# Patient Record
Sex: MALE | Race: OTHER | NOT HISPANIC OR LATINO
[De-identification: names, ages, dates, MRNs, and addresses within clinical notes are randomized per-mention and may not be internally consistent; named-entity substitution may affect disease eponyms.]

---

## 2017-01-09 ENCOUNTER — APPOINTMENT (OUTPATIENT)
Dept: CARDIOLOGY | Facility: CLINIC | Age: 27
End: 2017-01-09

## 2020-08-29 ENCOUNTER — TRANSCRIPTION ENCOUNTER (OUTPATIENT)
Age: 30
End: 2020-08-29

## 2022-02-01 ENCOUNTER — EMERGENCY (EMERGENCY)
Facility: HOSPITAL | Age: 32
LOS: 1 days | Discharge: HOME | End: 2022-02-01
Attending: EMERGENCY MEDICINE | Admitting: EMERGENCY MEDICINE
Payer: MEDICAID

## 2022-02-01 VITALS
DIASTOLIC BLOOD PRESSURE: 76 MMHG | HEART RATE: 60 BPM | SYSTOLIC BLOOD PRESSURE: 127 MMHG | RESPIRATION RATE: 18 BRPM | TEMPERATURE: 98 F | HEIGHT: 71 IN | OXYGEN SATURATION: 99 %

## 2022-02-01 DIAGNOSIS — Z98.89 OTHER SPECIFIED POSTPROCEDURAL STATES: Chronic | ICD-10-CM

## 2022-02-01 LAB
ALBUMIN SERPL ELPH-MCNC: 4 G/DL — SIGNIFICANT CHANGE UP (ref 3.3–5)
ALP SERPL-CCNC: 72 U/L — SIGNIFICANT CHANGE UP (ref 40–120)
ALT FLD-CCNC: 39 U/L — SIGNIFICANT CHANGE UP (ref 4–41)
ANION GAP SERPL CALC-SCNC: 12 MMOL/L — SIGNIFICANT CHANGE UP (ref 7–14)
AST SERPL-CCNC: 31 U/L — SIGNIFICANT CHANGE UP (ref 4–40)
BASOPHILS # BLD AUTO: 0.03 K/UL — SIGNIFICANT CHANGE UP (ref 0–0.2)
BASOPHILS NFR BLD AUTO: 0.2 % — SIGNIFICANT CHANGE UP (ref 0–2)
BILIRUB SERPL-MCNC: 0.3 MG/DL — SIGNIFICANT CHANGE UP (ref 0.2–1.2)
BUN SERPL-MCNC: 20 MG/DL — SIGNIFICANT CHANGE UP (ref 7–23)
CALCIUM SERPL-MCNC: 9.3 MG/DL — SIGNIFICANT CHANGE UP (ref 8.4–10.5)
CHLORIDE SERPL-SCNC: 102 MMOL/L — SIGNIFICANT CHANGE UP (ref 98–107)
CO2 SERPL-SCNC: 23 MMOL/L — SIGNIFICANT CHANGE UP (ref 22–31)
CREAT SERPL-MCNC: 0.84 MG/DL — SIGNIFICANT CHANGE UP (ref 0.5–1.3)
EOSINOPHIL # BLD AUTO: 0.06 K/UL — SIGNIFICANT CHANGE UP (ref 0–0.5)
EOSINOPHIL NFR BLD AUTO: 0.4 % — SIGNIFICANT CHANGE UP (ref 0–6)
GLUCOSE SERPL-MCNC: 97 MG/DL — SIGNIFICANT CHANGE UP (ref 70–99)
HCT VFR BLD CALC: 43.6 % — SIGNIFICANT CHANGE UP (ref 39–50)
HGB BLD-MCNC: 14.7 G/DL — SIGNIFICANT CHANGE UP (ref 13–17)
IANC: 12.01 K/UL — HIGH (ref 1.5–8.5)
IMM GRANULOCYTES NFR BLD AUTO: 0.4 % — SIGNIFICANT CHANGE UP (ref 0–1.5)
LIDOCAIN IGE QN: 37 U/L — SIGNIFICANT CHANGE UP (ref 7–60)
LYMPHOCYTES # BLD AUTO: 1.18 K/UL — SIGNIFICANT CHANGE UP (ref 1–3.3)
LYMPHOCYTES # BLD AUTO: 8.4 % — LOW (ref 13–44)
MCHC RBC-ENTMCNC: 28.1 PG — SIGNIFICANT CHANGE UP (ref 27–34)
MCHC RBC-ENTMCNC: 33.7 GM/DL — SIGNIFICANT CHANGE UP (ref 32–36)
MCV RBC AUTO: 83.2 FL — SIGNIFICANT CHANGE UP (ref 80–100)
MONOCYTES # BLD AUTO: 0.68 K/UL — SIGNIFICANT CHANGE UP (ref 0–0.9)
MONOCYTES NFR BLD AUTO: 4.9 % — SIGNIFICANT CHANGE UP (ref 2–14)
NEUTROPHILS # BLD AUTO: 12.01 K/UL — HIGH (ref 1.8–7.4)
NEUTROPHILS NFR BLD AUTO: 85.7 % — HIGH (ref 43–77)
NRBC # BLD: 0 /100 WBCS — SIGNIFICANT CHANGE UP
NRBC # FLD: 0 K/UL — SIGNIFICANT CHANGE UP
PLATELET # BLD AUTO: 248 K/UL — SIGNIFICANT CHANGE UP (ref 150–400)
POTASSIUM SERPL-MCNC: 3.8 MMOL/L — SIGNIFICANT CHANGE UP (ref 3.5–5.3)
POTASSIUM SERPL-SCNC: 3.8 MMOL/L — SIGNIFICANT CHANGE UP (ref 3.5–5.3)
PROT SERPL-MCNC: 7.4 G/DL — SIGNIFICANT CHANGE UP (ref 6–8.3)
RBC # BLD: 5.24 M/UL — SIGNIFICANT CHANGE UP (ref 4.2–5.8)
RBC # FLD: 13.2 % — SIGNIFICANT CHANGE UP (ref 10.3–14.5)
SODIUM SERPL-SCNC: 137 MMOL/L — SIGNIFICANT CHANGE UP (ref 135–145)
WBC # BLD: 14.01 K/UL — HIGH (ref 3.8–10.5)
WBC # FLD AUTO: 14.01 K/UL — HIGH (ref 3.8–10.5)

## 2022-02-01 PROCEDURE — 99284 EMERGENCY DEPT VISIT MOD MDM: CPT

## 2022-02-01 PROCEDURE — 76705 ECHO EXAM OF ABDOMEN: CPT | Mod: 26

## 2022-02-01 RX ORDER — SIMETHICONE 80 MG/1
80 TABLET, CHEWABLE ORAL ONCE
Refills: 0 | Status: COMPLETED | OUTPATIENT
Start: 2022-02-01 | End: 2022-02-01

## 2022-02-01 RX ORDER — SUCRALFATE 1 G
1 TABLET ORAL
Qty: 30 | Refills: 0
Start: 2022-02-01 | End: 2022-02-10

## 2022-02-01 RX ORDER — FAMOTIDINE 10 MG/ML
1 INJECTION INTRAVENOUS
Qty: 30 | Refills: 0
Start: 2022-02-01 | End: 2022-02-15

## 2022-02-01 RX ORDER — SUCRALFATE 1 G
1 TABLET ORAL ONCE
Refills: 0 | Status: COMPLETED | OUTPATIENT
Start: 2022-02-01 | End: 2022-02-01

## 2022-02-01 RX ORDER — FAMOTIDINE 10 MG/ML
20 INJECTION INTRAVENOUS ONCE
Refills: 0 | Status: COMPLETED | OUTPATIENT
Start: 2022-02-01 | End: 2022-02-01

## 2022-02-01 RX ORDER — ONDANSETRON 8 MG/1
4 TABLET, FILM COATED ORAL ONCE
Refills: 0 | Status: COMPLETED | OUTPATIENT
Start: 2022-02-01 | End: 2022-02-01

## 2022-02-01 RX ORDER — SODIUM CHLORIDE 9 MG/ML
1000 INJECTION INTRAMUSCULAR; INTRAVENOUS; SUBCUTANEOUS ONCE
Refills: 0 | Status: COMPLETED | OUTPATIENT
Start: 2022-02-01 | End: 2022-02-01

## 2022-02-01 RX ADMIN — ONDANSETRON 4 MILLIGRAM(S): 8 TABLET, FILM COATED ORAL at 18:50

## 2022-02-01 RX ADMIN — Medication 30 MILLILITER(S): at 18:51

## 2022-02-01 RX ADMIN — FAMOTIDINE 20 MILLIGRAM(S): 10 INJECTION INTRAVENOUS at 18:51

## 2022-02-01 RX ADMIN — SODIUM CHLORIDE 1000 MILLILITER(S): 9 INJECTION INTRAMUSCULAR; INTRAVENOUS; SUBCUTANEOUS at 20:09

## 2022-02-01 RX ADMIN — SIMETHICONE 80 MILLIGRAM(S): 80 TABLET, CHEWABLE ORAL at 21:01

## 2022-02-01 RX ADMIN — Medication 1 GRAM(S): at 21:01

## 2022-02-01 NOTE — ED ADULT NURSE NOTE - OBJECTIVE STATEMENT
Pt AOx4 came in c/o abdominal pain started since this afternoon. IV to left hand by EMS intact. labs done and meds given as ordered. awaiting for US.

## 2022-02-01 NOTE — ED ADULT TRIAGE NOTE - CHIEF COMPLAINT QUOTE
Pt brought in by EMS from home complaining of abdominal pain after eating Chipotle and abdominal pain. Pt given 100mcg of Fentanyl IVP. 20G IV placed by EMS to L hand. Pt denies chest pain, sob, fever or chills.

## 2022-02-01 NOTE — ED PROVIDER NOTE - OBJECTIVE STATEMENT
31m presents to the ed with abd pain, vomiting, diarrhea. Patient was at baseline soh this am. Ate takeout chipotle, 2 hours later began to have diarrhea/vomiting and severe epigastric nonradiating abd pain. NO fevers, chills, ha, cp, sob, dysuria.

## 2022-02-01 NOTE — ED PROVIDER NOTE - CLINICAL SUMMARY MEDICAL DECISION MAKING FREE TEXT BOX
epigastric pain with vomiting and diarrhea few hours after eating chipotle, appears nontoxic, abd soft and nontender. Plan for labs, gb us, hydration gi cocktail-will eval for elec disturbance, organ dysfunction, suspect more likely food borne toxin, reass.

## 2022-02-01 NOTE — ED ADULT NURSE NOTE - NSICDXPASTMEDICALHX_GEN_ALL_CORE_FT
PAST MEDICAL HISTORY:  Chronic abdominal pain     Constipation     GERD (gastroesophageal reflux disease)     No pertinent past medical history

## 2022-02-01 NOTE — ED PROVIDER NOTE - PATIENT PORTAL LINK FT
You can access the FollowMyHealth Patient Portal offered by [facility] by registering at the following website: [email or web address]. By joining [employer]’s FollowMyHealth portal, you will also be able to view your health information using other applications (apps) compatible with our system.

## 2022-02-01 NOTE — ED PROVIDER NOTE - PROGRESS NOTE DETAILS
CLAUDIA Hansen: Pt was reassessed, feels better, tolerated PO, discussed gallstones with pt, pt states he does not want surgery follow up at this time, willing to follow up with GI. Will D/C with outpatient follow up.

## 2022-02-01 NOTE — ED PROVIDER NOTE - PHYSICAL EXAMINATION
GEN - NAD; well appearing; A+O x3   HEAD - NC/AT   EYES- PERRL, EOMI  ENT: Airway patent, dry mm, Oral cavity and pharynx normal. No inflammation, swelling, exudate, or lesions.    NECK: Neck supple, no masses.  PULMONARY - CTA b/l, symmetric breath sounds.   CARDIAC -s1s2, RRR, no M,G,R  ABDOMEN - +BS, ND, NT, soft, no guarding, no rebound, no masses   BACK - no CVA tenderness, Normal  spine   EXTREMITIES - FROM, symmetric pulses, capillary refill < 2 seconds, no edema   SKIN - no rash or bruising   NEUROLOGIC - alert, speech clear, no focal deficits  PSYCH -nl mood/affect, nl insight.

## 2022-02-02 ENCOUNTER — APPOINTMENT (OUTPATIENT)
Dept: GASTROENTEROLOGY | Facility: CLINIC | Age: 32
End: 2022-02-02
Payer: MEDICAID

## 2022-02-02 VITALS
BODY MASS INDEX: 32.76 KG/M2 | OXYGEN SATURATION: 95 % | HEART RATE: 77 BPM | TEMPERATURE: 98.5 F | DIASTOLIC BLOOD PRESSURE: 79 MMHG | HEIGHT: 71 IN | WEIGHT: 234 LBS | SYSTOLIC BLOOD PRESSURE: 128 MMHG

## 2022-02-02 PROCEDURE — 99203 OFFICE O/P NEW LOW 30 MIN: CPT

## 2022-02-02 NOTE — ASSESSMENT
[FreeTextEntry1] : Upper abdominal pain and dyspepsia rule out gastritis, H. pylori, peptic ulcer disease.

## 2022-02-02 NOTE — HISTORY OF PRESENT ILLNESS
[FreeTextEntry1] : Edward rodney a Mohamed is a 31-year-old  male was recently seen in the emergency room for upper abdominal pain gas bloating nausea and vomiting which lasted for 2 days.  He said he had similar symptoms for in 2050.  Denies rectal bleeding melena diarrhea or weight loss.  Denies taking nonsteroidal medications or aspirin denies taking alcohol.  Admits smoking cigarettes and marijuana regularly.  He had an upper endoscopy and colonoscopy about 7 years ago supposedly normal.  He thinks his large bowel is obstructed with stools.  He is asking for bowel cleansers and colonoscopy

## 2022-02-02 NOTE — PHYSICAL EXAM
[General Appearance - Alert] : alert [General Appearance - In No Acute Distress] : in no acute distress [Sclera] : the sclera and conjunctiva were normal [Extraocular Movements] : extraocular movements were intact [Outer Ear] : the ears and nose were normal in appearance [Examination Of The Oral Cavity] : the lips and gums were normal [Neck Appearance] : the appearance of the neck was normal [Neck Cervical Mass (___cm)] : no neck mass was observed [Jugular Venous Distention Increased] : there was no jugular-venous distention [Thyroid Diffuse Enlargement] : the thyroid was not enlarged [Thyroid Nodule] : there were no palpable thyroid nodules [Auscultation Breath Sounds / Voice Sounds] : lungs were clear to auscultation bilaterally [Full Pulse] : the pedal pulses are present [Edema] : there was no peripheral edema [Bowel Sounds] : normal bowel sounds [Abdomen Soft] : soft [Abdomen Tenderness] : non-tender [Abdomen Mass (___ Cm)] : no abdominal mass palpated [Cervical Lymph Nodes Enlarged Posterior Bilaterally] : posterior cervical [Cervical Lymph Nodes Enlarged Anterior Bilaterally] : anterior cervical [Supraclavicular Lymph Nodes Enlarged Bilaterally] : supraclavicular [Axillary Lymph Nodes Enlarged Bilaterally] : axillary [Femoral Lymph Nodes Enlarged Bilaterally] : femoral [Inguinal Lymph Nodes Enlarged Bilaterally] : inguinal [No Spinal Tenderness] : no spinal tenderness [Abnormal Walk] : normal gait [Nail Clubbing] : no clubbing  or cyanosis of the fingernails [Involuntary Movements] : no involuntary movements were seen [Skin Color & Pigmentation] : normal skin color and pigmentation [Skin Turgor] : normal skin turgor [] : no rash [Motor Exam] : the motor exam was normal [No Focal Deficits] : no focal deficits [Oriented To Time, Place, And Person] : oriented to person, place, and time

## 2023-08-10 DIAGNOSIS — Z13.79 ENCOUNTER FOR OTHER SCREENING FOR GENETIC AND CHROMOSOMAL ANOMALIES: ICD-10-CM

## 2023-08-25 LAB
MISCELLANEOUS TEST: NORMAL
PROC NAME: NORMAL

## 2023-08-31 LAB
MISCELLANEOUS TEST: NORMAL
PROC NAME: NORMAL

## 2023-09-11 ENCOUNTER — TRANSCRIPTION ENCOUNTER (OUTPATIENT)
Age: 33
End: 2023-09-11

## 2024-02-07 ENCOUNTER — APPOINTMENT (OUTPATIENT)
Dept: SURGERY | Facility: CLINIC | Age: 34
End: 2024-02-07
Payer: COMMERCIAL

## 2024-02-07 VITALS
OXYGEN SATURATION: 98 % | BODY MASS INDEX: 30.8 KG/M2 | WEIGHT: 220 LBS | HEART RATE: 54 BPM | SYSTOLIC BLOOD PRESSURE: 116 MMHG | RESPIRATION RATE: 17 BRPM | DIASTOLIC BLOOD PRESSURE: 80 MMHG | TEMPERATURE: 98.7 F | HEIGHT: 71 IN

## 2024-02-07 DIAGNOSIS — K80.20 CALCULUS OF GALLBLADDER W/OUT CHOLECYSTITIS W/OUT OBSTRUCTION: ICD-10-CM

## 2024-02-07 PROCEDURE — 99203 OFFICE O/P NEW LOW 30 MIN: CPT

## 2024-02-07 RX ORDER — OMEPRAZOLE 20 MG/1
TABLET, DELAYED RELEASE ORAL
Refills: 0 | Status: ACTIVE | COMMUNITY

## 2024-02-07 RX ORDER — FAMOTIDINE 40 MG/1
40 TABLET, FILM COATED ORAL
Refills: 0 | Status: ACTIVE | COMMUNITY

## 2024-02-07 NOTE — CONSULT LETTER
[Dear  ___] : Dear  [unfilled], [Consult Letter:] : I had the pleasure of evaluating your patient, [unfilled]. [Please see my note below.] : Please see my note below. [Consult Closing:] : Thank you very much for allowing me to participate in the care of this patient.  If you have any questions, please do not hesitate to contact me. [Sincerely,] : Sincerely, [FreeTextEntry3] : Reji Valladares MD, FACS [facility] [address]  [place]  [address] Tel: [phone] Email: [email or web address]

## 2024-02-07 NOTE — HISTORY OF PRESENT ILLNESS
[de-identified] : ALYSIA  is a 33 year  male  here for a consultation for possible gallstones. [de-identified] : This 33-year-old patient complains of a 10 to 15-year history of intermittent episodes of right upper quadrant pain.  The pain frequently radiates to his right side and back.  He has nausea and emesis with these episodes.  His pain is frequently brought on by fatty foods.  These painful episodes last up to several hours then subside spontaneously.

## 2024-02-07 NOTE — PLAN
[FreeTextEntry1] : I have offered this patient a laparoscopic cholecystectomy.  The procedure as well as risks(including, but not limited to bleeding, infection, injury to hollow viscus, injury to bile ducts), benefits (pain relief), and alternatives were explained to the patient.  Please advise me on the safety of general anesthesia for this very pleasant patient

## 2024-02-07 NOTE — DATA REVIEWED
[FreeTextEntry1] : I reviewed the sonogram dated January 19.  It is consistent with stones and sludge without gallbladder wall thickening or pericholecystic fluid.  The common bile duct was measured at 4 mm  I also reviewed blood work dated January 19 which was significant for a lipase of 173, Hemoglobin A1c of a normal CBC, nonreactive hepatitis serology

## 2024-02-14 ENCOUNTER — OUTPATIENT (OUTPATIENT)
Dept: OUTPATIENT SERVICES | Facility: HOSPITAL | Age: 34
LOS: 1 days | End: 2024-02-14
Payer: COMMERCIAL

## 2024-02-14 VITALS
RESPIRATION RATE: 18 BRPM | HEART RATE: 63 BPM | WEIGHT: 220.46 LBS | SYSTOLIC BLOOD PRESSURE: 127 MMHG | HEIGHT: 70 IN | OXYGEN SATURATION: 97 % | DIASTOLIC BLOOD PRESSURE: 80 MMHG | TEMPERATURE: 99 F

## 2024-02-14 DIAGNOSIS — Z98.890 OTHER SPECIFIED POSTPROCEDURAL STATES: Chronic | ICD-10-CM

## 2024-02-14 DIAGNOSIS — Z01.818 ENCOUNTER FOR OTHER PREPROCEDURAL EXAMINATION: ICD-10-CM

## 2024-02-14 DIAGNOSIS — Z98.89 OTHER SPECIFIED POSTPROCEDURAL STATES: Chronic | ICD-10-CM

## 2024-02-14 DIAGNOSIS — K80.20 CALCULUS OF GALLBLADDER WITHOUT CHOLECYSTITIS WITHOUT OBSTRUCTION: ICD-10-CM

## 2024-02-14 PROCEDURE — 86850 RBC ANTIBODY SCREEN: CPT

## 2024-02-14 PROCEDURE — G0463: CPT

## 2024-02-14 PROCEDURE — 86901 BLOOD TYPING SEROLOGIC RH(D): CPT

## 2024-02-14 PROCEDURE — 86900 BLOOD TYPING SEROLOGIC ABO: CPT

## 2024-02-14 PROCEDURE — 36415 COLL VENOUS BLD VENIPUNCTURE: CPT

## 2024-02-14 NOTE — H&P PST ADULT - NSICDXPASTMEDICALHX_GEN_ALL_CORE_FT
PAST MEDICAL HISTORY:  Chronic abdominal pain     Constipation     GERD (gastroesophageal reflux disease)     Seasonal rhinitis

## 2024-02-14 NOTE — H&P PST ADULT - NSICDXPASTSURGICALHX_GEN_ALL_CORE_FT
PAST SURGICAL HISTORY:  History of appendectomy     History of colonoscopy     History of endoscopy

## 2024-02-14 NOTE — H&P PST ADULT - NSANTHOSAYNRD_GEN_A_CORE
neck 17 inches/No. BETH screening performed.  STOP BANG Legend: 0-2 = LOW Risk; 3-4 = INTERMEDIATE Risk; 5-8 = HIGH Risk

## 2024-02-14 NOTE — H&P PST ADULT - HISTORY OF PRESENT ILLNESS
Patient is a [age] year old M/F with PMHx of // with no pertinent medical history presents for perioperative testing for laparoscopic cholecystectomy with ICG with Dr. Reji Valenzuela scheduled for 02/16/2024. Denies any acute symptoms at this time. Otherwise patient reports feeling well overall.          RUQ pain with nausea, voitng, bloating after eatinf  Patient is a 33 year old M with no pertinent medical history presents for perioperative testing for laparoscopic cholecystectomy with ICG with Dr. Reji Valenzueal scheduled for 02/16/2024. Reports RUQ pain with nausea, vomiting bloating after eating for the past 12 years, therefore has opted for surgical intervention.  Denies any acute symptoms at this time. Otherwise patient reports feeling well overall.

## 2024-02-15 ENCOUNTER — TRANSCRIPTION ENCOUNTER (OUTPATIENT)
Age: 34
End: 2024-02-15

## 2024-02-16 ENCOUNTER — RESULT REVIEW (OUTPATIENT)
Age: 34
End: 2024-02-16

## 2024-02-16 ENCOUNTER — TRANSCRIPTION ENCOUNTER (OUTPATIENT)
Age: 34
End: 2024-02-16

## 2024-02-16 ENCOUNTER — APPOINTMENT (OUTPATIENT)
Dept: SURGERY | Facility: HOSPITAL | Age: 34
End: 2024-02-16
Payer: COMMERCIAL

## 2024-02-16 ENCOUNTER — INPATIENT (INPATIENT)
Facility: HOSPITAL | Age: 34
LOS: 0 days | Discharge: HOME | DRG: 419 | End: 2024-02-16
Attending: HOSPITALIST | Admitting: HOSPITALIST
Payer: COMMERCIAL

## 2024-02-16 VITALS
WEIGHT: 220.46 LBS | OXYGEN SATURATION: 98 % | HEIGHT: 70 IN | TEMPERATURE: 98 F | DIASTOLIC BLOOD PRESSURE: 77 MMHG | RESPIRATION RATE: 21 BRPM | SYSTOLIC BLOOD PRESSURE: 118 MMHG | HEART RATE: 68 BPM

## 2024-02-16 VITALS
TEMPERATURE: 97 F | SYSTOLIC BLOOD PRESSURE: 145 MMHG | HEART RATE: 55 BPM | OXYGEN SATURATION: 96 % | DIASTOLIC BLOOD PRESSURE: 85 MMHG | RESPIRATION RATE: 16 BRPM

## 2024-02-16 DIAGNOSIS — Z98.890 OTHER SPECIFIED POSTPROCEDURAL STATES: Chronic | ICD-10-CM

## 2024-02-16 DIAGNOSIS — Z98.89 OTHER SPECIFIED POSTPROCEDURAL STATES: Chronic | ICD-10-CM

## 2024-02-16 DIAGNOSIS — K80.50 CALCULUS OF BILE DUCT WITHOUT CHOLANGITIS OR CHOLECYSTITIS WITHOUT OBSTRUCTION: ICD-10-CM

## 2024-02-16 PROBLEM — J30.2 OTHER SEASONAL ALLERGIC RHINITIS: Chronic | Status: ACTIVE | Noted: 2024-02-14

## 2024-02-16 LAB
ALBUMIN SERPL ELPH-MCNC: 3.4 G/DL — SIGNIFICANT CHANGE UP (ref 3.3–5)
ALP SERPL-CCNC: 63 U/L — SIGNIFICANT CHANGE UP (ref 40–120)
ALT FLD-CCNC: 37 U/L — SIGNIFICANT CHANGE UP (ref 10–45)
ANION GAP SERPL CALC-SCNC: 9 MMOL/L — SIGNIFICANT CHANGE UP (ref 5–17)
APTT BLD: 35 SEC — SIGNIFICANT CHANGE UP (ref 24.5–35.6)
AST SERPL-CCNC: 27 U/L — SIGNIFICANT CHANGE UP (ref 10–40)
BASOPHILS # BLD AUTO: 0.04 K/UL — SIGNIFICANT CHANGE UP (ref 0–0.2)
BASOPHILS NFR BLD AUTO: 0.5 % — SIGNIFICANT CHANGE UP (ref 0–2)
BILIRUB SERPL-MCNC: 0.4 MG/DL — SIGNIFICANT CHANGE UP (ref 0.2–1.2)
BLD GP AB SCN SERPL QL: SIGNIFICANT CHANGE UP
BUN SERPL-MCNC: 19 MG/DL — SIGNIFICANT CHANGE UP (ref 7–23)
CALCIUM SERPL-MCNC: 8.7 MG/DL — SIGNIFICANT CHANGE UP (ref 8.4–10.5)
CHLORIDE SERPL-SCNC: 104 MMOL/L — SIGNIFICANT CHANGE UP (ref 96–108)
CO2 SERPL-SCNC: 28 MMOL/L — SIGNIFICANT CHANGE UP (ref 22–31)
CREAT SERPL-MCNC: 0.98 MG/DL — SIGNIFICANT CHANGE UP (ref 0.5–1.3)
EGFR: 106 ML/MIN/1.73M2 — SIGNIFICANT CHANGE UP
EOSINOPHIL # BLD AUTO: 0.1 K/UL — SIGNIFICANT CHANGE UP (ref 0–0.5)
EOSINOPHIL NFR BLD AUTO: 1.2 % — SIGNIFICANT CHANGE UP (ref 0–6)
GLUCOSE SERPL-MCNC: 101 MG/DL — HIGH (ref 70–99)
HCT VFR BLD CALC: 39.7 % — SIGNIFICANT CHANGE UP (ref 39–50)
HGB BLD-MCNC: 13.3 G/DL — SIGNIFICANT CHANGE UP (ref 13–17)
IMM GRANULOCYTES NFR BLD AUTO: 0.2 % — SIGNIFICANT CHANGE UP (ref 0–0.9)
INR BLD: 1.06 RATIO — SIGNIFICANT CHANGE UP (ref 0.85–1.18)
LIDOCAIN IGE QN: 69 U/L — SIGNIFICANT CHANGE UP (ref 16–77)
LYMPHOCYTES # BLD AUTO: 1.64 K/UL — SIGNIFICANT CHANGE UP (ref 1–3.3)
LYMPHOCYTES # BLD AUTO: 19.2 % — SIGNIFICANT CHANGE UP (ref 13–44)
MCHC RBC-ENTMCNC: 28.1 PG — SIGNIFICANT CHANGE UP (ref 27–34)
MCHC RBC-ENTMCNC: 33.5 GM/DL — SIGNIFICANT CHANGE UP (ref 32–36)
MCV RBC AUTO: 83.9 FL — SIGNIFICANT CHANGE UP (ref 80–100)
MONOCYTES # BLD AUTO: 0.6 K/UL — SIGNIFICANT CHANGE UP (ref 0–0.9)
MONOCYTES NFR BLD AUTO: 7 % — SIGNIFICANT CHANGE UP (ref 2–14)
NEUTROPHILS # BLD AUTO: 6.13 K/UL — SIGNIFICANT CHANGE UP (ref 1.8–7.4)
NEUTROPHILS NFR BLD AUTO: 71.9 % — SIGNIFICANT CHANGE UP (ref 43–77)
NRBC # BLD: 0 /100 WBCS — SIGNIFICANT CHANGE UP (ref 0–0)
PLATELET # BLD AUTO: 250 K/UL — SIGNIFICANT CHANGE UP (ref 150–400)
POTASSIUM SERPL-MCNC: 4.2 MMOL/L — SIGNIFICANT CHANGE UP (ref 3.5–5.3)
POTASSIUM SERPL-SCNC: 4.2 MMOL/L — SIGNIFICANT CHANGE UP (ref 3.5–5.3)
PROT SERPL-MCNC: 7.2 G/DL — SIGNIFICANT CHANGE UP (ref 6–8.3)
PROTHROM AB SERPL-ACNC: 12.1 SEC — SIGNIFICANT CHANGE UP (ref 9.5–13)
RBC # BLD: 4.73 M/UL — SIGNIFICANT CHANGE UP (ref 4.2–5.8)
RBC # FLD: 13.1 % — SIGNIFICANT CHANGE UP (ref 10.3–14.5)
SODIUM SERPL-SCNC: 141 MMOL/L — SIGNIFICANT CHANGE UP (ref 135–145)
WBC # BLD: 8.53 K/UL — SIGNIFICANT CHANGE UP (ref 3.8–10.5)
WBC # FLD AUTO: 8.53 K/UL — SIGNIFICANT CHANGE UP (ref 3.8–10.5)

## 2024-02-16 PROCEDURE — 99285 EMERGENCY DEPT VISIT HI MDM: CPT

## 2024-02-16 PROCEDURE — C1889: CPT

## 2024-02-16 PROCEDURE — 88304 TISSUE EXAM BY PATHOLOGIST: CPT | Mod: 26

## 2024-02-16 PROCEDURE — 93005 ELECTROCARDIOGRAM TRACING: CPT

## 2024-02-16 PROCEDURE — 47563 LAPARO CHOLECYSTECTOMY/GRAPH: CPT | Mod: 82

## 2024-02-16 PROCEDURE — 85610 PROTHROMBIN TIME: CPT

## 2024-02-16 PROCEDURE — C9399: CPT

## 2024-02-16 PROCEDURE — 80053 COMPREHEN METABOLIC PANEL: CPT

## 2024-02-16 PROCEDURE — 86900 BLOOD TYPING SEROLOGIC ABO: CPT

## 2024-02-16 PROCEDURE — 83690 ASSAY OF LIPASE: CPT

## 2024-02-16 PROCEDURE — 85730 THROMBOPLASTIN TIME PARTIAL: CPT

## 2024-02-16 PROCEDURE — 99223 1ST HOSP IP/OBS HIGH 75: CPT

## 2024-02-16 PROCEDURE — 47563 LAPARO CHOLECYSTECTOMY/GRAPH: CPT

## 2024-02-16 PROCEDURE — 86850 RBC ANTIBODY SCREEN: CPT

## 2024-02-16 PROCEDURE — 88304 TISSUE EXAM BY PATHOLOGIST: CPT

## 2024-02-16 PROCEDURE — 36415 COLL VENOUS BLD VENIPUNCTURE: CPT

## 2024-02-16 PROCEDURE — 86901 BLOOD TYPING SEROLOGIC RH(D): CPT

## 2024-02-16 PROCEDURE — 93010 ELECTROCARDIOGRAM REPORT: CPT

## 2024-02-16 PROCEDURE — 85025 COMPLETE CBC W/AUTO DIFF WBC: CPT

## 2024-02-16 RX ORDER — ACETAMINOPHEN 500 MG
650 TABLET ORAL EVERY 6 HOURS
Refills: 0 | Status: DISCONTINUED | OUTPATIENT
Start: 2024-02-16 | End: 2024-02-16

## 2024-02-16 RX ORDER — HYDROMORPHONE HYDROCHLORIDE 2 MG/ML
0.5 INJECTION INTRAMUSCULAR; INTRAVENOUS; SUBCUTANEOUS
Refills: 0 | Status: DISCONTINUED | OUTPATIENT
Start: 2024-02-16 | End: 2024-02-16

## 2024-02-16 RX ORDER — POLYETHYLENE GLYCOL 3350 17 G/17G
17 POWDER, FOR SOLUTION ORAL DAILY
Refills: 0 | Status: CANCELLED | OUTPATIENT
Start: 2024-02-17 | End: 2024-02-16

## 2024-02-16 RX ORDER — OXYCODONE HYDROCHLORIDE 5 MG/1
1 TABLET ORAL
Qty: 10 | Refills: 0
Start: 2024-02-16

## 2024-02-16 RX ORDER — MORPHINE SULFATE 50 MG/1
4 CAPSULE, EXTENDED RELEASE ORAL EVERY 4 HOURS
Refills: 0 | Status: DISCONTINUED | OUTPATIENT
Start: 2024-02-16 | End: 2024-02-16

## 2024-02-16 RX ORDER — ONDANSETRON 8 MG/1
4 TABLET, FILM COATED ORAL ONCE
Refills: 0 | Status: COMPLETED | OUTPATIENT
Start: 2024-02-16 | End: 2024-02-16

## 2024-02-16 RX ORDER — KETOROLAC TROMETHAMINE 30 MG/ML
30 SYRINGE (ML) INJECTION ONCE
Refills: 0 | Status: DISCONTINUED | OUTPATIENT
Start: 2024-02-16 | End: 2024-02-16

## 2024-02-16 RX ORDER — OXYCODONE HYDROCHLORIDE 5 MG/1
5 TABLET ORAL ONCE
Refills: 0 | Status: DISCONTINUED | OUTPATIENT
Start: 2024-02-16 | End: 2024-02-16

## 2024-02-16 RX ORDER — SODIUM CHLORIDE 9 MG/ML
1000 INJECTION INTRAMUSCULAR; INTRAVENOUS; SUBCUTANEOUS ONCE
Refills: 0 | Status: COMPLETED | OUTPATIENT
Start: 2024-02-16 | End: 2024-02-16

## 2024-02-16 RX ORDER — ONDANSETRON 8 MG/1
4 TABLET, FILM COATED ORAL EVERY 8 HOURS
Refills: 0 | Status: DISCONTINUED | OUTPATIENT
Start: 2024-02-16 | End: 2024-02-16

## 2024-02-16 RX ORDER — SODIUM CHLORIDE 9 MG/ML
1000 INJECTION, SOLUTION INTRAVENOUS
Refills: 0 | Status: DISCONTINUED | OUTPATIENT
Start: 2024-02-16 | End: 2024-02-16

## 2024-02-16 RX ORDER — LANOLIN ALCOHOL/MO/W.PET/CERES
3 CREAM (GRAM) TOPICAL AT BEDTIME
Refills: 0 | Status: DISCONTINUED | OUTPATIENT
Start: 2024-02-16 | End: 2024-02-16

## 2024-02-16 RX ADMIN — OXYCODONE HYDROCHLORIDE 5 MILLIGRAM(S): 5 TABLET ORAL at 17:36

## 2024-02-16 RX ADMIN — Medication 30 MILLIGRAM(S): at 13:58

## 2024-02-16 RX ADMIN — HYDROMORPHONE HYDROCHLORIDE 0.5 MILLIGRAM(S): 2 INJECTION INTRAMUSCULAR; INTRAVENOUS; SUBCUTANEOUS at 12:49

## 2024-02-16 RX ADMIN — OXYCODONE HYDROCHLORIDE 5 MILLIGRAM(S): 5 TABLET ORAL at 17:03

## 2024-02-16 RX ADMIN — HYDROMORPHONE HYDROCHLORIDE 0.5 MILLIGRAM(S): 2 INJECTION INTRAMUSCULAR; INTRAVENOUS; SUBCUTANEOUS at 13:05

## 2024-02-16 RX ADMIN — ONDANSETRON 4 MILLIGRAM(S): 8 TABLET, FILM COATED ORAL at 14:19

## 2024-02-16 RX ADMIN — HYDROMORPHONE HYDROCHLORIDE 0.5 MILLIGRAM(S): 2 INJECTION INTRAMUSCULAR; INTRAVENOUS; SUBCUTANEOUS at 12:51

## 2024-02-16 RX ADMIN — HYDROMORPHONE HYDROCHLORIDE 0.5 MILLIGRAM(S): 2 INJECTION INTRAMUSCULAR; INTRAVENOUS; SUBCUTANEOUS at 12:39

## 2024-02-16 RX ADMIN — HYDROMORPHONE HYDROCHLORIDE 0.5 MILLIGRAM(S): 2 INJECTION INTRAMUSCULAR; INTRAVENOUS; SUBCUTANEOUS at 13:20

## 2024-02-16 RX ADMIN — Medication 30 MILLIGRAM(S): at 13:20

## 2024-02-16 RX ADMIN — SODIUM CHLORIDE 1000 MILLILITER(S): 9 INJECTION INTRAMUSCULAR; INTRAVENOUS; SUBCUTANEOUS at 07:57

## 2024-02-16 RX ADMIN — HYDROMORPHONE HYDROCHLORIDE 0.5 MILLIGRAM(S): 2 INJECTION INTRAMUSCULAR; INTRAVENOUS; SUBCUTANEOUS at 13:06

## 2024-02-16 NOTE — ED PROVIDER NOTE - PATIENT PORTAL LINK FT
You can access the FollowMyHealth Patient Portal offered by [facility] by registering at the following website: [email or web address]. By joining [employer]’s FollowMyHealth portal, you will also be able to view your health information using other applications (apps) compatible with our system.

## 2024-02-16 NOTE — CHART NOTE - NSCHARTNOTEFT_GEN_A_CORE
33 year old male with past medical history of GERD, cholelithiasis presents from home with RUQ pain. Patient reports >10 year history of cholelithiasis with intermittent RUQ pain previously managed with diet and exercise.  He's now having more frequent RUQ pain with increased intensity, unrelated to food. Pain is located in the RUQ, non-radiating, associated with nausea and diaphoresis. Last night he developed 10/10 RUQ pain that woke him up from sleep. Pain is consistent with his usual biliary colic. Pain was uncontrolled so he called EMS, improved now s/p IV morphine en route. In the ED, he has been afebrile and hemodynamically stable. Pt underwent Lap perfecto 2/16, postop pt had abd pain, on RUQ and shoulder. Pain controlled with medication, denies chest pain, sob. dizziness, n,v      PAST MEDICAL & SURGICAL HISTORY:  Chronic abdominal pain  nausea and vomiting      GERD (gastroesophageal reflux disease)      Constipation      Seasonal rhinitis      History of appendectomy      History of colonoscopy      History of endoscopy      MEDICATIONS  (STANDING):  lactated ringers. 1000 milliLiter(s) (75 mL/Hr) IV Continuous <Continuous>    MEDICATIONS  (PRN):  HYDROmorphone  Injectable 0.5 milliGRAM(s) IV Push every 10 minutes PRN Moderate Pain (4 - 6)  oxyCODONE    IR 5 milliGRAM(s) Oral once PRN Severe Pain (7 - 10)    PE: Vital Signs Last 24 Hrs  T(C): 37.1 (16 Feb 2024 12:33), Max: 37.1 (16 Feb 2024 12:33)  T(F): 98.8 (16 Feb 2024 12:33), Max: 98.8 (16 Feb 2024 12:33)  HR: 52 (16 Feb 2024 15:15) (48 - 79)  BP: 118/85 (16 Feb 2024 15:15) (118/77 - 146/100)  BP(mean): --  RR: 18 (16 Feb 2024 15:15) (13 - 21)  SpO2: 100% (16 Feb 2024 15:15) (95% - 100%)    Parameters below as of 16 Feb 2024 14:30  Patient On (Oxygen Delivery Method): nasal cannula  O2 Flow (L/min): 3  Gen: A& O x3 nad  abd: incisions clean dry steri strips intact, softly distended, tender on RUQ  : voiding  Calfs: bilateral soft, nontender, no swelling     Plan  Discussed pts vitals and conditon with Surgeon, medicine, and Anesthesia.  agreed for dc with surgeon fu as outpt

## 2024-02-16 NOTE — H&P ADULT - NSHPPHYSICALEXAM_GEN_ALL_CORE
regular T(C): 36.6 (02-16-24 @ 05:57), Max: 36.6 (02-16-24 @ 05:57)  HR: 68 (02-16-24 @ 05:57) (68 - 68)  BP: 118/77 (02-16-24 @ 05:57) (118/77 - 118/77)  RR: 21 (02-16-24 @ 05:57) (21 - 21)  SpO2: 98% (02-16-24 @ 05:57) (98% - 98%)  Wt(kg): --Vital Signs Last 24 Hrs  T(C): 36.6 (16 Feb 2024 05:57), Max: 36.6 (16 Feb 2024 05:57)  T(F): 97.9 (16 Feb 2024 05:57), Max: 97.9 (16 Feb 2024 05:57)  HR: 68 (16 Feb 2024 05:57) (68 - 68)  BP: 118/77 (16 Feb 2024 05:57) (118/77 - 118/77)  BP(mean): --  RR: 21 (16 Feb 2024 05:57) (21 - 21)  SpO2: 98% (16 Feb 2024 05:57) (98% - 98%)    Parameters below as of 16 Feb 2024 05:57  Patient On (Oxygen Delivery Method): room air    PHYSICAL EXAM:  GENERAL: NAD, well appearing  HEENT: NCAT  NECK: Supple, No JVD  CHEST/LUNG: Clear to percussion bilaterally; No rales, rhonchi, wheezing  HEART: Regular rate and rhythm; No murmurs  ABDOMEN: Soft, Nontender, Nondistended; Bowel sounds present  MUSCULOSKELETAL/EXTREMITIES:  2+ Peripheral Pulses, No LE edema  SKIN: No rashes or lesions  PSYCH: Appropriate affect  NEURO: AAO x 3, nonfocal

## 2024-02-16 NOTE — ED PROVIDER NOTE - PHYSICAL EXAMINATION
Vitals: I have reviewed the patients vital signs  General: nontoxic appearing  HEENT: Atraumatic, normocephalic, airway patent  Eyes: EOMI, tracking appropriately  Neck: no tracheal deviation  Chest/Lungs: no trauma, symmetric chest rise, speaking in complete sentences,  no resp distress  Heart: skin and extremities well perfused, regular rate and rhythm  Neuro: A+Ox3, appears non focal  MSK: no deformities  Skin: no cyanosis, no jaundice   Psych:  Normal mood and affect  Abdomen: Soft nontender normal active bowel sounds

## 2024-02-16 NOTE — ASU DISCHARGE PLAN (ADULT/PEDIATRIC) - CARE PROVIDER_API CALL
Reji Valenzuela.  Surgery  [address], [address]  [place] [address]  Phone: [phone]  Fax: [phone]  Follow Up Time: 2 weeks

## 2024-02-16 NOTE — ED ADULT NURSE NOTE - CHIEF COMPLAINT QUOTE
pt  from  home [place]   he is scheduled for gallstone removal today at  9 am with doctor Reji Russell   he awoke with extreme and called  911  ems gave I litre  ns and 5 mg morphine ivp en route by  ems pt  states  pain  relieved

## 2024-02-16 NOTE — H&P ADULT - HISTORY OF PRESENT ILLNESS
33 year old male with past medical history of cholelithiasis presents from home with RUQ pain. Patient reports >10 year history of cholelithiasis with intermittent RUQ pain previously managed with diet and exercise.  He's now having more frequent RUQ pain with increased intensity, unrelated to food. Pain is located in the RUQ, non-radiating, associated with nausea and diaphoresis. He saw Dr. Valenzuela outpatient and is scheduled for elective cholecystectomy 2/16.  Last night he developed 10/10 RUQ pain that woke him up from sleep so he called EMS. Pain is improved now s/p IV morphine en route. He denies any other complaints.     In the ED, he has been afebrile and hemodynamically stable.  Labs were otherwise unremarkable, no leukocytosis, LFT and lipase WNL.    He was seen by surgery and will be going for scheduled lap perfecto this AM. 33 year old male with past medical history of GERD, cholelithiasis presents from home with RUQ pain. Patient reports >10 year history of cholelithiasis with intermittent RUQ pain previously managed with diet and exercise.  He's now having more frequent RUQ pain with increased intensity, unrelated to food. Pain is located in the RUQ, non-radiating, associated with nausea and diaphoresis. He saw Dr. Valenzuela outpatient and is scheduled for elective cholecystectomy 2/16.  Last night he developed 10/10 RUQ pain that woke him up from sleep. Pain is consistent with his usual biliary colic. Pain was uncontrolled so he called EMS, improved now s/p IV morphine en route. He denies any other complaints.     In the ED, he has been afebrile and hemodynamically stable.  Labs were otherwise unremarkable, no leukocytosis, LFT and lipase WNL.    He was seen by surgery and will be going for scheduled lap perfecto this AM.

## 2024-02-16 NOTE — H&P ADULT - NSHPLABSRESULTS_GEN_ALL_CORE
LABS:                        13.3   8.53  )-----------( 250      ( 16 Feb 2024 06:00 )             39.7     02-16    141  |  104  |  19  ----------------------------<  101<H>  4.2   |  28  |  0.98    Ca    8.7      16 Feb 2024 06:00    TPro  7.2  /  Alb  3.4  /  TBili  0.4  /  DBili  x   /  AST  27  /  ALT  37  /  AlkPhos  63  02-16      Urinalysis Basic - ( 16 Feb 2024 06:00 )    Color: x / Appearance: x / SG: x / pH: x  Gluc: 101 mg/dL / Ketone: x  / Bili: x / Urobili: x   Blood: x / Protein: x / Nitrite: x   Leuk Esterase: x / RBC: x / WBC x   Sq Epi: x / Non Sq Epi: x / Bacteria: x     CAPILLARY BLOOD GLUCOSE    RADIOLOGY & ADDITIONAL TESTS:

## 2024-02-16 NOTE — ASU DISCHARGE PLAN (ADULT/PEDIATRIC) - ASU DC SPECIAL INSTRUCTIONSFT
You may shower in 24 hours.  Do not remove steri strips.  Do not let water hit wound directly, do not rub incision.      Do not drive for 24 hours following anesthesia.  Do not drive while taking narcotic pain medication.  Do not drive until pain free.       You may resume your usual daily diet as tolerated.      No exercise, strenuous physical activity, or heavy lifting (nothing heavier than 5 lbs).  for 2 weeks     You may perform your usual daily activities as tolerated (e.g. walking, stairclimbing, etc.).      Take oxycodone as prescribed for severe pain.  You may take over-the-counter 375mg tylenol for mild to moderate pain (2-3 pills every 6 hours as needed, take with food).      Follow-up with Dr. Valenzuela in his office in 2 weeks - call office for appointment if not already made.

## 2024-02-16 NOTE — ED PROVIDER NOTE - PROGRESS NOTE DETAILS
Patient laboratory studies are well.  Will send him perioperative to see if he can get an earlier than scheduled.  I did attempt to reach his surgeon to let him know that he was here.

## 2024-02-16 NOTE — ED ADULT NURSE NOTE - NSICDXPASTMEDICALHX_GEN_ALL_CORE_FT
PAST MEDICAL HISTORY:  Chronic abdominal pain nausea and vomiting    Constipation     GERD (gastroesophageal reflux disease)     Seasonal rhinitis

## 2024-02-16 NOTE — ED ADULT TRIAGE NOTE - CHIEF COMPLAINT QUOTE
pt  from  home [place]   he is scheduled for gallstone removal today at  9 am with doctor Reji Russell   he awoke with extreme and called  911  t  give I litre  ns and 5 mg morphine ivp en route by  ems pt  states  pain  relieved

## 2024-02-16 NOTE — H&P ADULT - ASSESSMENT
33 year old male with past medical history of cholelithiasis presents from home with RUQ pain.    Known Cholelithiasis   RUQ Pain 2/2 Biliary Colic  -Afebrile, no leukocytosis, LFT/lipase WNL  -Discussed with surgery, will proceed with planned elective cholecystectomy this morning  -NPO  -Type and screen  -IVF hydration  -Pain control PRN     History of Constipation  -Start miralax QD    DVT PPx  -Holding chemical PPx pending OR    Plan of care discussed with patient and family (wife, sister) at bedside, all questions were answered.  Discussed with surgery team and Dr. Brown  33 year old male with past medical history of GERD, cholelithiasis presents from home with RUQ pain.    Known Cholelithiasis   RUQ Pain 2/2 Biliary Colic  -Afebrile, no leukocytosis, LFT/lipase WNL  -Discussed with surgery, will proceed with planned elective cholecystectomy this morning  -NPO  -Type and screen  -IVF hydration  -Pain control PRN     History of Constipation  -Start miralax QD    GERD  -can resume PPI/famotidine as needed post-op    DVT PPx  -Holding chemical PPx pending OR    Plan of care discussed with patient and family (wife, sister) at bedside, all questions were answered.  Discussed with surgery team and Dr. Brown

## 2024-02-16 NOTE — ED PROVIDER NOTE - OBJECTIVE STATEMENT
33-year-old male past medical history of cholelithiasis presents emerged from today after the sudden onset of right upper quadrant abdominal pain at approximately 4 AM.  Feels similar to his previous gallbladder attacks.  There was no nausea vomiting just some slight diaphoresis.  No fevers.  In route EMS gave him 5 mg of morphine.  He currently feels significantly better with no pain at the current time.  The patient is scheduled for an outpatient laparoscopic cholecystectomy today at 9 AM here at [facility].

## 2024-02-16 NOTE — BRIEF OPERATIVE NOTE - OPERATION/FINDINGS
laparoscopic cholecystectomy. critical view of safety achieved: cystic duct and artery identified, clipped, and divided.

## 2024-02-16 NOTE — ED PROVIDER NOTE - CLINICAL SUMMARY MEDICAL DECISION MAKING FREE TEXT BOX
33-year-old male past medical history of cholelithiasis here with what likely is biliary colic.  We will get a CBC and a CMP just to make sure his liver markers are not significantly elevated.  Right knee is nontender at this time makes an episode of acute cholecystitis very unlikely.  We will call surgery once his laboratory studies are back.  We will transfer him up to same-day surgery.  The patient does not request any further pain medication at this time.

## 2024-02-16 NOTE — ASU DISCHARGE PLAN (ADULT/PEDIATRIC) - NS MD DC FALL RISK RISK
For information on Fall & Injury Prevention, visit: [email or web address] OR  [email or web address] OR  https://www.cdc.gov/steadi/patient.html

## 2024-02-16 NOTE — ASU DISCHARGE PLAN (ADULT/PEDIATRIC) - NURSING INSTRUCTIONS
Drink plenty of fluids. Take tylenol for mild-moderate pain. Take prescribed oxycodone for severe pain. Please follow up with MD for post-op appointment.

## 2024-02-27 NOTE — HISTORY OF PRESENT ILLNESS
[de-identified] : Anusha is a 34 y/o male here for a post op visit. S/p 02/16/2024- Laparoscopic cholecystectomy

## 2024-02-28 ENCOUNTER — INPATIENT (INPATIENT)
Facility: HOSPITAL | Age: 34
LOS: 3 days | Discharge: HOME | DRG: 439 | End: 2024-03-03
Attending: HOSPITALIST | Admitting: STUDENT IN AN ORGANIZED HEALTH CARE EDUCATION/TRAINING PROGRAM
Payer: COMMERCIAL

## 2024-02-28 ENCOUNTER — APPOINTMENT (OUTPATIENT)
Dept: SURGERY | Facility: CLINIC | Age: 34
End: 2024-02-28

## 2024-02-28 VITALS
SYSTOLIC BLOOD PRESSURE: 130 MMHG | HEIGHT: 71 IN | WEIGHT: 199.96 LBS | RESPIRATION RATE: 18 BRPM | OXYGEN SATURATION: 98 % | DIASTOLIC BLOOD PRESSURE: 88 MMHG | TEMPERATURE: 98 F | HEART RATE: 62 BPM

## 2024-02-28 DIAGNOSIS — K85.90 ACUTE PANCREATITIS WITHOUT NECROSIS OR INFECTION, UNSPECIFIED: ICD-10-CM

## 2024-02-28 DIAGNOSIS — Z98.89 OTHER SPECIFIED POSTPROCEDURAL STATES: Chronic | ICD-10-CM

## 2024-02-28 DIAGNOSIS — Z98.890 OTHER SPECIFIED POSTPROCEDURAL STATES: Chronic | ICD-10-CM

## 2024-02-28 LAB
ALBUMIN SERPL ELPH-MCNC: 3.2 G/DL — LOW (ref 3.3–5)
ALP SERPL-CCNC: 87 U/L — SIGNIFICANT CHANGE UP (ref 40–120)
ALT FLD-CCNC: 297 U/L — HIGH (ref 10–45)
ANION GAP SERPL CALC-SCNC: 8 MMOL/L — SIGNIFICANT CHANGE UP (ref 5–17)
AST SERPL-CCNC: 138 U/L — HIGH (ref 10–40)
BASOPHILS # BLD AUTO: 0.01 K/UL — SIGNIFICANT CHANGE UP (ref 0–0.2)
BASOPHILS NFR BLD AUTO: 0.1 % — SIGNIFICANT CHANGE UP (ref 0–2)
BILIRUB DIRECT SERPL-MCNC: 0.1 MG/DL — SIGNIFICANT CHANGE UP (ref 0–0.3)
BILIRUB SERPL-MCNC: 0.3 MG/DL — SIGNIFICANT CHANGE UP (ref 0.2–1.2)
BUN SERPL-MCNC: 12 MG/DL — SIGNIFICANT CHANGE UP (ref 7–23)
CALCIUM SERPL-MCNC: 8.7 MG/DL — SIGNIFICANT CHANGE UP (ref 8.4–10.5)
CHLORIDE SERPL-SCNC: 104 MMOL/L — SIGNIFICANT CHANGE UP (ref 96–108)
CO2 SERPL-SCNC: 28 MMOL/L — SIGNIFICANT CHANGE UP (ref 22–31)
CREAT SERPL-MCNC: 0.98 MG/DL — SIGNIFICANT CHANGE UP (ref 0.5–1.3)
EGFR: 105 ML/MIN/1.73M2 — SIGNIFICANT CHANGE UP
EOSINOPHIL # BLD AUTO: 0.04 K/UL — SIGNIFICANT CHANGE UP (ref 0–0.5)
EOSINOPHIL NFR BLD AUTO: 0.4 % — SIGNIFICANT CHANGE UP (ref 0–6)
GLUCOSE SERPL-MCNC: 91 MG/DL — SIGNIFICANT CHANGE UP (ref 70–99)
HCT VFR BLD CALC: 38 % — LOW (ref 39–50)
HGB BLD-MCNC: 12.9 G/DL — LOW (ref 13–17)
IMM GRANULOCYTES NFR BLD AUTO: 0.5 % — SIGNIFICANT CHANGE UP (ref 0–0.9)
LIDOCAIN IGE QN: >375 U/L — HIGH (ref 16–77)
LYMPHOCYTES # BLD AUTO: 1.51 K/UL — SIGNIFICANT CHANGE UP (ref 1–3.3)
LYMPHOCYTES # BLD AUTO: 13.9 % — SIGNIFICANT CHANGE UP (ref 13–44)
MCHC RBC-ENTMCNC: 28 PG — SIGNIFICANT CHANGE UP (ref 27–34)
MCHC RBC-ENTMCNC: 33.9 GM/DL — SIGNIFICANT CHANGE UP (ref 32–36)
MCV RBC AUTO: 82.4 FL — SIGNIFICANT CHANGE UP (ref 80–100)
MONOCYTES # BLD AUTO: 0.64 K/UL — SIGNIFICANT CHANGE UP (ref 0–0.9)
MONOCYTES NFR BLD AUTO: 5.9 % — SIGNIFICANT CHANGE UP (ref 2–14)
NEUTROPHILS # BLD AUTO: 8.58 K/UL — HIGH (ref 1.8–7.4)
NEUTROPHILS NFR BLD AUTO: 79.2 % — HIGH (ref 43–77)
NRBC # BLD: 0 /100 WBCS — SIGNIFICANT CHANGE UP (ref 0–0)
PLATELET # BLD AUTO: 317 K/UL — SIGNIFICANT CHANGE UP (ref 150–400)
POTASSIUM SERPL-MCNC: 4.2 MMOL/L — SIGNIFICANT CHANGE UP (ref 3.5–5.3)
POTASSIUM SERPL-SCNC: 4.2 MMOL/L — SIGNIFICANT CHANGE UP (ref 3.5–5.3)
PROT SERPL-MCNC: 7.1 G/DL — SIGNIFICANT CHANGE UP (ref 6–8.3)
RBC # BLD: 4.61 M/UL — SIGNIFICANT CHANGE UP (ref 4.2–5.8)
RBC # FLD: 12.7 % — SIGNIFICANT CHANGE UP (ref 10.3–14.5)
SODIUM SERPL-SCNC: 140 MMOL/L — SIGNIFICANT CHANGE UP (ref 135–145)
WBC # BLD: 10.83 K/UL — HIGH (ref 3.8–10.5)
WBC # FLD AUTO: 10.83 K/UL — HIGH (ref 3.8–10.5)

## 2024-02-28 PROCEDURE — 99285 EMERGENCY DEPT VISIT HI MDM: CPT

## 2024-02-28 PROCEDURE — 99223 1ST HOSP IP/OBS HIGH 75: CPT | Mod: GC

## 2024-02-28 RX ORDER — MORPHINE SULFATE 50 MG/1
4 CAPSULE, EXTENDED RELEASE ORAL EVERY 4 HOURS
Refills: 0 | Status: DISCONTINUED | OUTPATIENT
Start: 2024-02-28 | End: 2024-02-29

## 2024-02-28 RX ORDER — FAMOTIDINE 10 MG/ML
40 INJECTION INTRAVENOUS AT BEDTIME
Refills: 0 | Status: DISCONTINUED | OUTPATIENT
Start: 2024-02-28 | End: 2024-03-03

## 2024-02-28 RX ORDER — LANOLIN ALCOHOL/MO/W.PET/CERES
3 CREAM (GRAM) TOPICAL AT BEDTIME
Refills: 0 | Status: DISCONTINUED | OUTPATIENT
Start: 2024-02-28 | End: 2024-03-03

## 2024-02-28 RX ORDER — ACETAMINOPHEN 500 MG
1000 TABLET ORAL ONCE
Refills: 0 | Status: COMPLETED | OUTPATIENT
Start: 2024-02-28 | End: 2024-02-28

## 2024-02-28 RX ORDER — FAMOTIDINE 10 MG/ML
20 INJECTION INTRAVENOUS ONCE
Refills: 0 | Status: COMPLETED | OUTPATIENT
Start: 2024-02-28 | End: 2024-02-28

## 2024-02-28 RX ORDER — SODIUM CHLORIDE 9 MG/ML
1000 INJECTION INTRAMUSCULAR; INTRAVENOUS; SUBCUTANEOUS ONCE
Refills: 0 | Status: COMPLETED | OUTPATIENT
Start: 2024-02-28 | End: 2024-02-28

## 2024-02-28 RX ORDER — SODIUM CHLORIDE 9 MG/ML
1000 INJECTION, SOLUTION INTRAVENOUS
Refills: 0 | Status: DISCONTINUED | OUTPATIENT
Start: 2024-02-28 | End: 2024-03-01

## 2024-02-28 RX ORDER — ACETAMINOPHEN 500 MG
650 TABLET ORAL EVERY 6 HOURS
Refills: 0 | Status: DISCONTINUED | OUTPATIENT
Start: 2024-02-28 | End: 2024-03-03

## 2024-02-28 RX ORDER — PANTOPRAZOLE SODIUM 20 MG/1
40 TABLET, DELAYED RELEASE ORAL DAILY
Refills: 0 | Status: DISCONTINUED | OUTPATIENT
Start: 2024-02-28 | End: 2024-03-01

## 2024-02-28 RX ORDER — MORPHINE SULFATE 50 MG/1
2 CAPSULE, EXTENDED RELEASE ORAL EVERY 4 HOURS
Refills: 0 | Status: DISCONTINUED | OUTPATIENT
Start: 2024-02-28 | End: 2024-02-29

## 2024-02-28 RX ORDER — LIDOCAINE 4 G/100G
10 CREAM TOPICAL ONCE
Refills: 0 | Status: COMPLETED | OUTPATIENT
Start: 2024-02-28 | End: 2024-02-28

## 2024-02-28 RX ORDER — ONDANSETRON 8 MG/1
4 TABLET, FILM COATED ORAL EVERY 8 HOURS
Refills: 0 | Status: DISCONTINUED | OUTPATIENT
Start: 2024-02-28 | End: 2024-03-03

## 2024-02-28 RX ADMIN — FAMOTIDINE 100 MILLIGRAM(S): 10 INJECTION INTRAVENOUS at 19:12

## 2024-02-28 RX ADMIN — SODIUM CHLORIDE 150 MILLILITER(S): 9 INJECTION, SOLUTION INTRAVENOUS at 21:06

## 2024-02-28 RX ADMIN — LIDOCAINE 10 MILLILITER(S): 4 CREAM TOPICAL at 19:12

## 2024-02-28 RX ADMIN — SODIUM CHLORIDE 1000 MILLILITER(S): 9 INJECTION INTRAMUSCULAR; INTRAVENOUS; SUBCUTANEOUS at 19:13

## 2024-02-28 RX ADMIN — Medication 400 MILLIGRAM(S): at 19:13

## 2024-02-28 RX ADMIN — Medication 1000 MILLIGRAM(S): at 19:43

## 2024-02-28 RX ADMIN — Medication 30 MILLILITER(S): at 19:12

## 2024-02-28 NOTE — H&P ADULT - HISTORY OF PRESENT ILLNESS
33 year old male with past medical history of GERD, cholelithiasis presents to ED with c/o RUQ pain. Patient reports >10 year history of cholelithiasis with intermittent RUQ pain previously managed with diet and exercise.  However, he is now having more frequent RUQ pain with increased intensity, unrelated to food. Pain is located in the RUQ, non-radiating, associated with nausea and diaphoresis. Of note, pt is s/p Lap perfecto 2/16/24. Pt states he presented to [facility] for intense epigastric pain, nausea and vomiting. + flatus and Bm , denies chest pain, sob, dizziness.       In ED, afebrile, HR 62, /88, RR 18, SpO2 98% on RA. Labs remarkable for WBC 10.83, AST//297, lipase >375.  Surgery consulted, rec trend LFT/lipase, LR fluids, GI consult for possible MRCP versus ERCP.  Given IV tylenol, pepcid, and viscous lidocaine.     33 year old male with past medical history of GERD, cholelithiasis presents to ED with c/o RUQ pain. Patient reports >10 year history of cholelithiasis with intermittent RUQ pain previously managed with diet and exercise.  However, he is now having more frequent RUQ pain with increased intensity, unrelated to food. Pain is located in the RUQ, non-radiating, described as constant pressure-like in nature, associated with nausea and diaphoresis. Of note, pt is s/p Lap perfecto 2/16/24. Pt presented from [facility] for intense epigastric pain, nausea and vomiting, was told to come here because they could not do further imaging there. Denies chest pain, sob, dizziness, hematemesis, or diarrhea.      In ED, afebrile, HR 62, /88, RR 18, SpO2 98% on RA. Labs remarkable for WBC 10.83, AST//297, lipase >375. Surgery consulted, rec trend LFT/lipase, LR fluids, GI consult for possible MRCP versus ERCP.  Given IV tylenol, pepcid, and viscous lidocaine.

## 2024-02-28 NOTE — ED PROVIDER NOTE - OBJECTIVE STATEMENT
s/p lap cholecystectomy by Dr. Beltran 2/7/24.  Pt referred by [facility] for MRI of abd. PT had CTA of chest, abd and pelvis that was unremarkable.  vomited x1 around 4 pm today. s/p lap cholecystectomy by Dr. Beltran 2/7/24.  Pt referred by [facility] for MRI of abd. PT had CTA of chest, abd and pelvis that was unremarkable.  vomited x1 around 4 pm today.  No other complaints.

## 2024-02-28 NOTE — ED ADULT NURSE NOTE - SUICIDE SCREENING QUESTION 3
Mom states Brianna has an appointment with  in December. Brianna wanted to know if there is something after 4:00. Mom is listed in the ambulatory verbal communication. Please advise   No

## 2024-02-28 NOTE — ED ADULT NURSE NOTE - OBJECTIVE STATEMENT
Patient presents to ED complaint of abdominal pain since last night. Had gallbladder removed on 2/7. Alert and oriented x 4. No signs or symptoms of nausea, vomiting, dizziness or SOB.

## 2024-02-28 NOTE — H&P ADULT - NSHPREVIEWOFSYSTEMS_GEN_ALL_CORE
CONSTITUTIONAL: No weakness, fevers or chills  EYES/ENT: No visual changes;  No vertigo or throat pain   NECK: No pain or stiffness  RESPIRATORY: No cough, wheezing, hemoptysis; No shortness of breath  CARDIOVASCULAR: No chest pain or palpitations  GASTROINTESTINAL: +abdominal pain. +nausea, +vomiting. No hematemesis; No diarrhea or constipation. No melena or hematochezia.  GENITOURINARY: No dysuria, frequency or hematuria  NEUROLOGICAL: No numbness or weakness  SKIN: No itching, rashes

## 2024-02-28 NOTE — H&P ADULT - ASSESSMENT
33 year old male with PMHx of GERD, cholelithiasis,  s/p Lap perfecto 2/16/24, presents to ED with c/o RUQ pain, admitted for acute pancreatitis     #Acute pancreatitis  #Elevated liver enzymes  -AST//297, lipase >375.  -s/p 1 L bolus NS in ED  - c/w LR at 150ml/hr  -Surgery consulted in ED, rec trend LFT/lipase, LR fluids, GI consult.  -GI consult for possible MRCP versus ERCP.   -Pain control with tylenol and morphine 2mg IV q4 prn       33 year old male with PMHx of GERD, cholelithiasis,  s/p Lap perfecto 2/16/24, presents to ED with c/o RUQ pain, admitted for acute pancreatitis     #Acute pancreatitis, s/p cholecystectomy on 2/16/24  CTA chest/abd/pelvis 2/28/24 done at [facility] with no evidence of PE or acute chest process, recent postsurgical changes of cholecystectomy, diffuse mild intrahepatic and extrahepatic biliary dilation, small hiatal hernia  -AST//297, lipase >375.  -s/p 1 L bolus NS in ED  -c/w LR at 150ml/hr  -protonix ordered  -Surgery consulted in ED. Recs appreciated.   -GI consulted for possible MRCP versus ERCP.   -Pain control with morphine prn  -NPO  -T&S, coags ordered  -Trend LFTs and lipase    #Leukocytosis  WBC 10.83  -No fever or signs of surgical site infection  -continue to monitor     DVT ppx: SCDs  Code: Full         33 year old male with PMHx of GERD, cholelithiasis,  s/p Lap perfecto 2/16/24, presents to ED with c/o RUQ pain, admitted for acute pancreatitis     #Acute pancreatitis, s/p cholecystectomy on 2/16/24  CTA chest/abd/pelvis 2/28/24 done at [facility] with no evidence of PE or acute chest process, recent postsurgical changes of cholecystectomy, diffuse mild intrahepatic and extrahepatic biliary dilation, small hiatal hernia  -AST//297, lipase >375.  -s/p 1 L bolus NS in ED  -c/w LR at 150ml/hr  -protonix ordered  -Surgery consulted in ED. Recs appreciated.   -GI consulted for possible MRCP versus ERCP.   -Pain control with morphine prn  -NPO  -T&S, coags ordered  -Trend LFTs and lipase    #Leukocytosis  WBC 10.83  -No fever or signs of surgical site infection  -continue to monitor     #GERD  -c/w home famotidine 40mg qhs prn   -home omeprazole substitute with protonix    DVT ppx: SCDs  Code: Full         33 year old male with PMHx of GERD, cholelithiasis,  s/p Lap perfecto 2/16/24, presents to ED with c/o RUQ pain, admitted for acute pancreatitis     #Acute pancreatitis, s/p cholecystectomy on 2/16/24  CTA chest/abd/pelvis 2/28/24 done at [facility] with no evidence of PE or acute chest process, recent postsurgical changes of cholecystectomy, diffuse mild intrahepatic and extrahepatic biliary dilation, small hiatal hernia  -AST//297, lipase >375.  -s/p 1 L bolus NS in ED  -c/w LR at 150ml/hr  -protonix ordered  -Surgery consulted in ED. Recs appreciated.   -GI consulted for possible MRCP versus ERCP.   -Pain control with morphine prn  -NPO, advance diet as tolerated   -T&S, coags ordered  -Trend LFTs and lipase    #Leukocytosis  WBC 10.83  -No fever or signs of surgical site infection  -continue to monitor     #GERD  -c/w home famotidine 40mg qhs prn   -home omeprazole substitute with protonix    DVT ppx: SCDs  Code: Full

## 2024-02-28 NOTE — ED PROVIDER NOTE - PROGRESS NOTE DETAILS
case dw Dr. Dmitry Arboleda covering Dr. Valenzuela pt was admitted for pancreatitis after surgical consult

## 2024-02-28 NOTE — H&P ADULT - NSHPPHYSICALEXAM_GEN_ALL_CORE
CONSTITUTIONAL: Well appearing, awake, alert, oriented to person, place, time/situation and in no apparent distress.  ENMT: Airway patent, Nasal mucosa clear. Mouth with normal mucosa. Throat has no vesicles, no oropharyngeal exudates and uvula is midline.  EYES: Clear bilaterally, pupils equal, round and reactive to light.  CARDIAC: Normal rate, regular rhythm.  Heart sounds S1, S2.  No murmurs, rubs or gallops.  RESPIRATORY: Breath sounds clear and equal bilaterally.  GASTROINTESTINAL: Abdomen soft, non-tender, no guarding.  MUSCULOSKELETAL: Spine appears normal, range of motion is not limited, no muscle or joint tenderness  NEUROLOGICAL: Alert and oriented, no focal deficits, no motor or sensory deficits.  SKIN: Skin normal color for race, warm, dry and intact. No evidence of rash.  PSYCHIATRIC: Alert and oriented to person, place, time/situation. normal mood and affect. no apparent risk to self or others. CONSTITUTIONAL: Well appearing, awake, alert, oriented to person, place, time/situation and in no apparent distress.  ENMT: Airway patent, Nasal mucosa clear. Mouth with normal mucosa. Throat has no vesicles, no oropharyngeal exudates and uvula is midline.  EYES: Clear bilaterally, pupils equal, round and reactive to light.  CARDIAC: Normal rate, regular rhythm.  Heart sounds S1, S2.  No murmurs, rubs or gallops.  RESPIRATORY: Breath sounds clear and equal bilaterally.  GASTROINTESTINAL: Abdomen soft, non-tender, no guarding. Surgical site scars noted on abdomen, well-healing.  MUSCULOSKELETAL: Spine appears normal, range of motion is not limited, no muscle or joint tenderness  NEUROLOGICAL: Alert and oriented, no focal deficits, no motor or sensory deficits.  SKIN: Skin normal color for race, warm, dry and intact. No evidence of rash.  PSYCHIATRIC: Alert and oriented to person, place, time/situation. normal mood and affect. no apparent risk to self or others.

## 2024-02-29 LAB
ALBUMIN SERPL ELPH-MCNC: 3.1 G/DL — LOW (ref 3.3–5)
ALBUMIN SERPL ELPH-MCNC: 3.1 G/DL — LOW (ref 3.3–5)
ALP SERPL-CCNC: 79 U/L — SIGNIFICANT CHANGE UP (ref 40–120)
ALP SERPL-CCNC: 79 U/L — SIGNIFICANT CHANGE UP (ref 40–120)
ALT FLD-CCNC: 249 U/L — HIGH (ref 10–45)
ALT FLD-CCNC: 249 U/L — HIGH (ref 10–45)
ANION GAP SERPL CALC-SCNC: 9 MMOL/L — SIGNIFICANT CHANGE UP (ref 5–17)
AST SERPL-CCNC: 77 U/L — HIGH (ref 10–40)
AST SERPL-CCNC: 77 U/L — HIGH (ref 10–40)
BILIRUB DIRECT SERPL-MCNC: 0.1 MG/DL — SIGNIFICANT CHANGE UP (ref 0–0.3)
BILIRUB DIRECT SERPL-MCNC: 0.1 MG/DL — SIGNIFICANT CHANGE UP (ref 0–0.3)
BILIRUB INDIRECT FLD-MCNC: 0.1 MG/DL — LOW (ref 0.2–1.2)
BILIRUB INDIRECT FLD-MCNC: 0.2 MG/DL — SIGNIFICANT CHANGE UP (ref 0.2–1)
BILIRUB SERPL-MCNC: 0.2 MG/DL — SIGNIFICANT CHANGE UP (ref 0.2–1.2)
BILIRUB SERPL-MCNC: 0.3 MG/DL — SIGNIFICANT CHANGE UP (ref 0.2–1.2)
BILIRUB SERPL-MCNC: 0.3 MG/DL — SIGNIFICANT CHANGE UP (ref 0.2–1.2)
BLD GP AB SCN SERPL QL: SIGNIFICANT CHANGE UP
BUN SERPL-MCNC: 9 MG/DL — SIGNIFICANT CHANGE UP (ref 7–23)
CALCIUM SERPL-MCNC: 8.8 MG/DL — SIGNIFICANT CHANGE UP (ref 8.4–10.5)
CHLORIDE SERPL-SCNC: 103 MMOL/L — SIGNIFICANT CHANGE UP (ref 96–108)
CO2 SERPL-SCNC: 28 MMOL/L — SIGNIFICANT CHANGE UP (ref 22–31)
CREAT SERPL-MCNC: 0.93 MG/DL — SIGNIFICANT CHANGE UP (ref 0.5–1.3)
EGFR: 112 ML/MIN/1.73M2 — SIGNIFICANT CHANGE UP
GLUCOSE SERPL-MCNC: 87 MG/DL — SIGNIFICANT CHANGE UP (ref 70–99)
HCT VFR BLD CALC: 39.9 % — SIGNIFICANT CHANGE UP (ref 39–50)
HGB BLD-MCNC: 12.9 G/DL — LOW (ref 13–17)
INR BLD: 0.96 RATIO — SIGNIFICANT CHANGE UP (ref 0.85–1.18)
LIDOCAIN IGE QN: 325 U/L — HIGH (ref 16–77)
MAGNESIUM SERPL-MCNC: 1.8 MG/DL — SIGNIFICANT CHANGE UP (ref 1.6–2.6)
MCHC RBC-ENTMCNC: 27.3 PG — SIGNIFICANT CHANGE UP (ref 27–34)
MCHC RBC-ENTMCNC: 32.3 GM/DL — SIGNIFICANT CHANGE UP (ref 32–36)
MCV RBC AUTO: 84.4 FL — SIGNIFICANT CHANGE UP (ref 80–100)
NRBC # BLD: 0 /100 WBCS — SIGNIFICANT CHANGE UP (ref 0–0)
PHOSPHATE SERPL-MCNC: 3.4 MG/DL — SIGNIFICANT CHANGE UP (ref 2.5–4.5)
PLATELET # BLD AUTO: 313 K/UL — SIGNIFICANT CHANGE UP (ref 150–400)
POTASSIUM SERPL-MCNC: 3.6 MMOL/L — SIGNIFICANT CHANGE UP (ref 3.5–5.3)
POTASSIUM SERPL-SCNC: 3.6 MMOL/L — SIGNIFICANT CHANGE UP (ref 3.5–5.3)
PROT SERPL-MCNC: 7.2 G/DL — SIGNIFICANT CHANGE UP (ref 6–8.3)
PROT SERPL-MCNC: 7.2 G/DL — SIGNIFICANT CHANGE UP (ref 6–8.3)
PROTHROM AB SERPL-ACNC: 11.3 SEC — SIGNIFICANT CHANGE UP (ref 9.5–13)
RBC # BLD: 4.73 M/UL — SIGNIFICANT CHANGE UP (ref 4.2–5.8)
RBC # FLD: 12.9 % — SIGNIFICANT CHANGE UP (ref 10.3–14.5)
SODIUM SERPL-SCNC: 140 MMOL/L — SIGNIFICANT CHANGE UP (ref 135–145)
WBC # BLD: 9.65 K/UL — SIGNIFICANT CHANGE UP (ref 3.8–10.5)
WBC # FLD AUTO: 9.65 K/UL — SIGNIFICANT CHANGE UP (ref 3.8–10.5)

## 2024-02-29 PROCEDURE — 99223 1ST HOSP IP/OBS HIGH 75: CPT

## 2024-02-29 PROCEDURE — 99233 SBSQ HOSP IP/OBS HIGH 50: CPT

## 2024-02-29 PROCEDURE — 76705 ECHO EXAM OF ABDOMEN: CPT | Mod: 26

## 2024-02-29 RX ORDER — MAGNESIUM SULFATE 500 MG/ML
2 VIAL (ML) INJECTION ONCE
Refills: 0 | Status: COMPLETED | OUTPATIENT
Start: 2024-02-29 | End: 2024-02-29

## 2024-02-29 RX ADMIN — SODIUM CHLORIDE 150 MILLILITER(S): 9 INJECTION, SOLUTION INTRAVENOUS at 08:51

## 2024-02-29 RX ADMIN — ONDANSETRON 4 MILLIGRAM(S): 8 TABLET, FILM COATED ORAL at 20:38

## 2024-02-29 RX ADMIN — SODIUM CHLORIDE 150 MILLILITER(S): 9 INJECTION, SOLUTION INTRAVENOUS at 20:41

## 2024-02-29 RX ADMIN — PANTOPRAZOLE SODIUM 40 MILLIGRAM(S): 20 TABLET, DELAYED RELEASE ORAL at 11:12

## 2024-02-29 RX ADMIN — Medication 25 GRAM(S): at 11:00

## 2024-02-29 NOTE — PROGRESS NOTE ADULT - ASSESSMENT
33 year old male PMH GERD, cholelithiasis,  s/p Lap perfecto 2/16/24, presents to ED with c/o RUQ pain, admitted for acute pancreatitis     #Acute pancreatitis  - s/p cholecystectomy on 2/16/24  - CTA chest/abd/pelvis 2/28/24 done at [facility] with no evidence of PE or acute chest process, recent postsurgical changes of cholecystectomy, diffuse mild intrahepatic and extrahepatic biliary dilation, small hiatal hernia  - LFTs elevated, lipase elevated but trending down  - continue aggressive IVF, NPO for now  - pain regimen  - discussed with GI, Dr. Will- needs urgent MRCP done. Paperwork completed and to be expedited for today  - surgery recs appreciated    #Leukocytosis  - noted on admission, likely in setting of acute pancreatitis  - now resolved, monitor off abx    #GERD  - continue home famotidine 40mg qhs prn   - home omeprazole substitute with protonix    #DVT ppx:  - SCDs 33 year old male PMH GERD, cholelithiasis,  s/p Lap perfecto 2/16/24, presents to ED with c/o RUQ pain, admitted for acute pancreatitis     #Acute pancreatitis  - s/p cholecystectomy on 2/16/24  - CTA chest/abd/pelvis 2/28/24 done at [facility] with no evidence of PE or acute chest process, recent postsurgical changes of cholecystectomy, diffuse mild intrahepatic and extrahepatic biliary dilation, small hiatal hernia  - LFTs elevated, lipase elevated but trending down  - continue aggressive IVF, NPO for now  - pain regimen  - discussed with GI, Dr. Will- needs urgent MRCP done. Paperwork completed and to be expedited for today  - surgery recs appreciated    #Leukocytosis  - noted on admission, likely in setting of acute pancreatitis  - now resolved, monitor off abx    #GERD  - continue home famotidine 40mg qhs prn   - home omeprazole substitute with protonix    #DVT ppx:  - SCDs    attempted to call wife Zuleima Mosquera, no answer.

## 2024-02-29 NOTE — CONSULT NOTE ADULT - ASSESSMENT
My impression is that of a young man who is several weeks status post cholecystectomy… Did well until acute onset of abdominal pain with evidence of pancreatitis and bile duct dilation on prior imaging studies.  Liver tests are essentially normal.    The patient may have passed biliary sludge, more likely than a delayed bile duct injury.
33 year old male with past medical history of GERD, cholelithiasis presents from home with RUQ pain. Patient reports >10 year history of cholelithiasis with intermittent RUQ pain previously managed with diet and exercise.  He's now having more frequent RUQ pain with increased intensity, unrelated to food. Pain is located in the RUQ, non-radiating, associated with nausea and diaphoresis. Pt is s/p Lap perfecto 2/16.  Pt to be admitted for Acute pancreatitis

## 2024-02-29 NOTE — CONSULT NOTE ADULT - SUBJECTIVE AND OBJECTIVE BOX
Chief Complaint:  Patient is a 33y old  Male who presents with a chief complaint of pancreatitis (29 Feb 2024 14:12)      HPI:    Allergies:  No Known Allergies      Medications:  acetaminophen     Tablet .. 650 milliGRAM(s) Oral every 6 hours PRN  aluminum hydroxide/magnesium hydroxide/simethicone Suspension 30 milliLiter(s) Oral every 4 hours PRN  famotidine    Tablet 40 milliGRAM(s) Oral at bedtime PRN  lactated ringers. 1000 milliLiter(s) IV Continuous <Continuous>  melatonin 3 milliGRAM(s) Oral at bedtime PRN  ondansetron Injectable 4 milliGRAM(s) IV Push every 8 hours PRN  pantoprazole  Injectable 40 milliGRAM(s) IV Push daily      PMHX/PSHX:  Chronic abdominal pain    No pertinent past medical history    No pertinent past medical history    GERD (gastroesophageal reflux disease)    Constipation    Seasonal rhinitis    No significant past surgical history    No significant past surgical history    History of appendectomy    History of colonoscopy    History of endoscopy        Family history:  No pertinent family history in first degree relatives    No pertinent family history in first degree relatives        Social History:     ROS:     General:  No wt loss, fevers, chills, night sweats, fatigue,   Eyes:  Good vision, no reported pain  ENT:  No sore throat, pain, runny nose, dysphagia  CV:  No pain, palpitations, hypo/hypertension  Resp:  No dyspnea, cough, tachypnea, wheezing  GI: per note and No pruritis, No rectal bleeding, No tarry stools, No dysphagia,  :  No pain, bleeding, incontinence, nocturia  Muscle:  No pain, weakness  Breast:  No pain, abscess, mass, discharge  Neuro:  No weakness, tingling, memory problems  Psych:  No fatigue, insomnia, mood problems, depression  Endocrine:  No polyuria, polydipsia, cold/heat intolerance  Heme:  No petechiae, ecchymosis, easy bruisability  Skin:  No rash    PHYSICAL EXAM:   Vital Signs:  Vital Signs Last 24 Hrs  T(C): 36.9 (29 Feb 2024 11:26), Max: 37.1 (28 Feb 2024 22:00)  T(F): 98.4 (29 Feb 2024 11:26), Max: 98.8 (28 Feb 2024 22:00)  HR: 57 (29 Feb 2024 11:26) (57 - 82)  BP: 149/89 (29 Feb 2024 11:26) (121/75 - 149/89)  BP(mean): --  RR: 16 (29 Feb 2024 11:26) (16 - 20)  SpO2: 98% (29 Feb 2024 11:26) (98% - 99%)    Parameters below as of 29 Feb 2024 11:26  Patient On (Oxygen Delivery Method): room air      Daily     Daily     GENERAL:  Appears stated age, well-groomed, well-nourished, no distress  HEENT:  NC/AT,  conjunctivae clear and pink, no thyromegaly, nodules, adenopathy, no JVD, sclera -anicteric  CHEST:  Full & symmetric excursion, no increased effort, breath sounds clear  HEART:  Regular rhythm  ABDOMEN:  Soft, non-tender, non-distended, normoactive bowel sounds,  no masses , no hepatosplenomegaly, no signs of chronic liver disease  EXTREMITIES:  no cyanosis,clubbing or edema  SKIN:  No rash/erythema/ecchymoses/petechiae/wounds/abscess/warm/dry  NEURO:  Alert, oriented, no asterixis, no tremor, no encephalopathy    LABS:                        12.9   9.65  )-----------( 313      ( 29 Feb 2024 07:47 )             39.9     02-29    140  |  103  |  9   ----------------------------<  87  3.6   |  28  |  0.93    Ca    8.8      29 Feb 2024 07:47  Phos  3.4     02-29  Mg     1.8     02-29    TPro  7.2  /  Alb  3.1<L>  /  TBili  0.3  /  DBili  0.1  /  AST  77<H>  /  ALT  249<H>  /  AlkPhos  79  02-29    LIVER FUNCTIONS - ( 29 Feb 2024 07:47 )  Alb: 3.1 g/dL / Pro: 7.2 g/dL / ALK PHOS: 79 U/L / ALT: 249 U/L / AST: 77 U/L / GGT: x           PT/INR - ( 29 Feb 2024 07:47 )   PT: 11.3 sec;   INR: 0.96 ratio           Urinalysis Basic - ( 29 Feb 2024 07:47 )    Color: x / Appearance: x / SG: x / pH: x  Gluc: 87 mg/dL / Ketone: x  / Bili: x / Urobili: x   Blood: x / Protein: x / Nitrite: x   Leuk Esterase: x / RBC: x / WBC x   Sq Epi: x / Non Sq Epi: x / Bacteria: x      Amylase Serum--      Lipase [id number]       Ammonia--      Imaging:          
33 year old male with past medical history of GERD, cholelithiasis presents from home with RUQ pain. Patient reports >10 year history of cholelithiasis with intermittent RUQ pain previously managed with diet and exercise.  He's now having more frequent RUQ pain with increased intensity, unrelated to food. Pain is located in the RUQ, non-radiating, associated with nausea and diaphoresis. Pt is s/p Lap perfecto 2/16. Pt states he presented to [facility] for intense epigastric pain, nausea and vomiting. + flatus and Bm , denies chest pain, sob, dizziness.       PAST MEDICAL & SURGICAL HISTORY:  Chronic abdominal pain  nausea and vomiting      GERD (gastroesophageal reflux disease)      Constipation      Seasonal rhinitis      History of appendectomy      History of colonoscopy      History of endoscopy      MEDICATIONS  (STANDING):    MEDICATIONS  (PRN):

## 2024-02-29 NOTE — CONSULT NOTE ADULT - PROBLEM SELECTOR RECOMMENDATION 9
Recommend IV fluids, n.p.o.    MRCP to rule out retained bile duct stone or bile duct injury.    Will follow closely
s/p Lap perfecto   trend LFT/lipase   LR fluids  admits to medicine  GI consult for possible MRCP versus ERCP  Discussed with Dr. Valenzuela

## 2024-02-29 NOTE — PATIENT PROFILE ADULT - FALL HARM RISK - UNIVERSAL INTERVENTIONS
Bed in lowest position, wheels locked, appropriate side rails in place/Call bell, personal items and telephone in reach/Instruct patient to call for assistance before getting out of bed or chair/Non-slip footwear when patient is out of bed/[place] to call system/Physically safe environment - no spills, clutter or unnecessary equipment/Purposeful Proactive Rounding/Room/bathroom lighting operational, light cord in reach

## 2024-02-29 NOTE — CHART NOTE - NSCHARTNOTEFT_GEN_A_CORE
Dr. Valenzuela is operating at another hospital and asked me to see patient.  Patient is s/p laparoscopic cholecystectomy 2/16/2024 for biliary colic(16 year hx).  I interviewed and examined patient in ED. Patient had severe epigastric pain and was seen at [facility] yesterday.  He feels better now.    Patient looks comfortable in ED [place]#17  Afebrile, vitals stable  Heent-sclera anicteric  abdomen-trocar sites clean and dry, soft, non distended, non tender    labs:    bili 0.2  lipase>375  creat 0.98  sgot 138  alk phos 297  wbc 10(dec)  h/h 12/36      a/p    Patient has benign abdomen.  I think patient passed stone in cbd and this accounts for elevate lipase.  I think it would be safe to start on PO diet and repeat lfts later and then send home.  However, patient would like to proceed with MRCP as he is worried about recurrent pain.  We will try to get MRCP today.        Thank you.    Dr. Srinivas Osborn  cell#[phone] Dr. Valenzuela is operating at another hospital and asked me to see patient.  Patient is s/p laparoscopic cholecystectomy 2/16/2024 for biliary colic(16 year hx).  I interviewed and examined patient in ED. Patient had severe epigastric pain and was seen at [facility] yesterday.  He feels better now.    Patient looks comfortable in ED [place]#17  Afebrile, vitals stable  Heent-sclera anicteric  abdomen-trocar sites clean and dry, soft, non distended, non tender    labs:    bili 0.2  lipase>375  creat 0.98  sgot 138  alk phos 297  wbc 10(dec)  h/h 12/36      a/p    Patient has benign abdomen.  I think patient passed stone in cbd and this accounts for the elevated lipase.  I think it would be safe to start on PO diet and repeat lfts later and then send home.  However, patient would like to proceed with MRCP as he is worried about recurrent pain.  We will try to get MRCP today.        Thank you.    Dr. Srinivas Osborn  cell#[phone]

## 2024-03-01 ENCOUNTER — APPOINTMENT (OUTPATIENT)
Dept: MRI IMAGING | Facility: HOSPITAL | Age: 34
End: 2024-03-01

## 2024-03-01 LAB
ALBUMIN SERPL ELPH-MCNC: 2.9 G/DL — LOW (ref 3.3–5)
ALP SERPL-CCNC: 73 U/L — SIGNIFICANT CHANGE UP (ref 40–120)
ALT FLD-CCNC: 163 U/L — HIGH (ref 10–45)
ANION GAP SERPL CALC-SCNC: 10 MMOL/L — SIGNIFICANT CHANGE UP (ref 5–17)
AST SERPL-CCNC: 37 U/L — SIGNIFICANT CHANGE UP (ref 10–40)
BILIRUB SERPL-MCNC: 0.3 MG/DL — SIGNIFICANT CHANGE UP (ref 0.2–1.2)
BUN SERPL-MCNC: 9 MG/DL — SIGNIFICANT CHANGE UP (ref 7–23)
CALCIUM SERPL-MCNC: 9 MG/DL — SIGNIFICANT CHANGE UP (ref 8.4–10.5)
CHLORIDE SERPL-SCNC: 105 MMOL/L — SIGNIFICANT CHANGE UP (ref 96–108)
CO2 SERPL-SCNC: 26 MMOL/L — SIGNIFICANT CHANGE UP (ref 22–31)
CREAT SERPL-MCNC: 0.88 MG/DL — SIGNIFICANT CHANGE UP (ref 0.5–1.3)
EGFR: 117 ML/MIN/1.73M2 — SIGNIFICANT CHANGE UP
GLUCOSE SERPL-MCNC: 79 MG/DL — SIGNIFICANT CHANGE UP (ref 70–99)
HCT VFR BLD CALC: 40.7 % — SIGNIFICANT CHANGE UP (ref 39–50)
HGB BLD-MCNC: 12.9 G/DL — LOW (ref 13–17)
LIDOCAIN IGE QN: 169 U/L — HIGH (ref 16–77)
MCHC RBC-ENTMCNC: 27.1 PG — SIGNIFICANT CHANGE UP (ref 27–34)
MCHC RBC-ENTMCNC: 31.7 GM/DL — LOW (ref 32–36)
MCV RBC AUTO: 85.5 FL — SIGNIFICANT CHANGE UP (ref 80–100)
NRBC # BLD: 0 /100 WBCS — SIGNIFICANT CHANGE UP (ref 0–0)
PLATELET # BLD AUTO: 214 K/UL — SIGNIFICANT CHANGE UP (ref 150–400)
POTASSIUM SERPL-MCNC: 4.2 MMOL/L — SIGNIFICANT CHANGE UP (ref 3.5–5.3)
POTASSIUM SERPL-SCNC: 4.2 MMOL/L — SIGNIFICANT CHANGE UP (ref 3.5–5.3)
PROT SERPL-MCNC: 6.7 G/DL — SIGNIFICANT CHANGE UP (ref 6–8.3)
RBC # BLD: 4.76 M/UL — SIGNIFICANT CHANGE UP (ref 4.2–5.8)
RBC # FLD: 13 % — SIGNIFICANT CHANGE UP (ref 10.3–14.5)
SODIUM SERPL-SCNC: 141 MMOL/L — SIGNIFICANT CHANGE UP (ref 135–145)
WBC # BLD: 8.53 K/UL — SIGNIFICANT CHANGE UP (ref 3.8–10.5)
WBC # FLD AUTO: 8.53 K/UL — SIGNIFICANT CHANGE UP (ref 3.8–10.5)

## 2024-03-01 PROCEDURE — 99233 SBSQ HOSP IP/OBS HIGH 50: CPT

## 2024-03-01 PROCEDURE — 74183 MRI ABD W/O CNTR FLWD CNTR: CPT | Mod: 26

## 2024-03-01 RX ORDER — PANTOPRAZOLE SODIUM 20 MG/1
40 TABLET, DELAYED RELEASE ORAL
Refills: 0 | Status: DISCONTINUED | OUTPATIENT
Start: 2024-03-02 | End: 2024-03-03

## 2024-03-01 RX ORDER — SODIUM CHLORIDE 9 MG/ML
1000 INJECTION, SOLUTION INTRAVENOUS
Refills: 0 | Status: DISCONTINUED | OUTPATIENT
Start: 2024-03-01 | End: 2024-03-03

## 2024-03-01 RX ADMIN — SODIUM CHLORIDE 100 MILLILITER(S): 9 INJECTION, SOLUTION INTRAVENOUS at 14:34

## 2024-03-01 RX ADMIN — PANTOPRAZOLE SODIUM 40 MILLIGRAM(S): 20 TABLET, DELAYED RELEASE ORAL at 12:10

## 2024-03-01 NOTE — PROGRESS NOTE ADULT - ASSESSMENT
33M with PMH GERD and Cholelithiasis, s/p Lap perfecto 2/16 now admitted with epigastric abdominal pain, and increased LFTs/Lipase, likely due to a passed stone.    Plan:  pain control prn  NPO/IVF for MRCP  if MRCP negative, patient can be safely discharged   if MRCP abnormal, will ask GI team to evaluate   if MRCP unable to be performed due to logistical reasons and patient asymptomatic without pain, please advance diet and will attempt to obtain MRCP as outpatient   DVT ppx       seen and discussed with Dr. Valenzuela

## 2024-03-01 NOTE — PROGRESS NOTE ADULT - ATTENDING COMMENTS
Patient seen/examined.  Agree w above note and plan and have discussed plan w house staff.  Denies pain, nausea.  Afebrile.  Abdomen soft, non-tender, wounds clean.  LFTs, lipase normalizing.  Suspect patient passed retained stone.  For MRCP today.  If unable to MRCP today, suggest d/c home (if able to tolerate diet) and outpatient MRCP    Reji Valenzuela MD

## 2024-03-01 NOTE — PROGRESS NOTE ADULT - ASSESSMENT
33 year old male PMH GERD, cholelithiasis,  s/p Lap perfecto 2/16/24, presents to ED with c/o RUQ pain, admitted for acute pancreatitis     #Acute pancreatitis  - s/p cholecystectomy on 2/16/24  - CTA chest/abd/pelvis 2/28/24 done at [facility] with no evidence of PE or acute chest process, recent postsurgical changes of cholecystectomy, diffuse mild intrahepatic and extrahepatic biliary dilation, small hiatal hernia  - RUQ Sono without biliary obstruction, LFTs elevated, Lipase elevated but trending down  - continue aggressive IVF, Pain management, Clears for now  - discussed with GI, Dr. Will- needs urgent MRCP done. Paperwork completed and to be expedited for today  - surgery and GI following    #Leukocytosis  - noted on admission, likely in setting of acute pancreatitis  - now resolved, monitor off abx    #GERD  - continue home famotidine 40mg qhs prn   - home omeprazole substitute with protonix    #DVT ppx:  - SCDs    called wife victor manuel heredia [phone] in order to provide an update, no answer, left vm with call back number

## 2024-03-01 NOTE — PROGRESS NOTE ADULT - PROBLEM SELECTOR PLAN 1
Improving clinically and biochemically but MRCP with filling defect suggestive of stone.    Plan: ERCP Monday r/b/a

## 2024-03-01 NOTE — PROGRESS NOTE ADULT - NS ATTEND AMEND GEN_ALL_CORE FT
Seen and examined. Reports pain has improved. Feels much better.     Plan for MRI today. If no gallstones, will advance diet and possible DC  IF MRI concerning for retained stone, will follow up with GI re: ERCP    Plan discussed personally with GI Dr. Will

## 2024-03-01 NOTE — PROGRESS NOTE ADULT - ASSESSMENT
My impression is that of a young man who is several weeks status post cholecystectomy… Did well until acute onset of abdominal pain with evidence of pancreatitis and bile duct dilation on prior imaging studies.  Liver tests are essentially normal.    The patient may have passed biliary sludge, more likely than a delayed bile duct injury.

## 2024-03-01 NOTE — CHART NOTE - NSCHARTNOTEFT_GEN_A_CORE
33 year old male PMH GERD, cholelithiasis,  s/p Lap perfecto 2/16/24, presents to ED with c/o RUQ pain, admitted for acute pancreatitis   MRCP with possible retained cbd stone, pt on bed comfortable.    Plan for ERCP on Monday as per GI  Clears with ensures clears  If pt develops fevers, chills, jaundice, Abd pain may warrant urgent ERCP  GI to follow   discussed with GI/Surgeon

## 2024-03-02 LAB
ALBUMIN SERPL ELPH-MCNC: 3.2 G/DL — LOW (ref 3.3–5)
ALP SERPL-CCNC: 83 U/L — SIGNIFICANT CHANGE UP (ref 40–120)
ALT FLD-CCNC: 163 U/L — HIGH (ref 10–45)
ANION GAP SERPL CALC-SCNC: 8 MMOL/L — SIGNIFICANT CHANGE UP (ref 5–17)
AST SERPL-CCNC: 53 U/L — HIGH (ref 10–40)
BILIRUB DIRECT SERPL-MCNC: 0.1 MG/DL — SIGNIFICANT CHANGE UP (ref 0–0.3)
BILIRUB INDIRECT FLD-MCNC: 0.4 MG/DL — SIGNIFICANT CHANGE UP (ref 0.2–1)
BILIRUB SERPL-MCNC: 0.5 MG/DL — SIGNIFICANT CHANGE UP (ref 0.2–1.2)
BUN SERPL-MCNC: 9 MG/DL — SIGNIFICANT CHANGE UP (ref 7–23)
CALCIUM SERPL-MCNC: 9.3 MG/DL — SIGNIFICANT CHANGE UP (ref 8.4–10.5)
CHLORIDE SERPL-SCNC: 104 MMOL/L — SIGNIFICANT CHANGE UP (ref 96–108)
CO2 SERPL-SCNC: 29 MMOL/L — SIGNIFICANT CHANGE UP (ref 22–31)
CREAT SERPL-MCNC: 1.24 MG/DL — SIGNIFICANT CHANGE UP (ref 0.5–1.3)
EGFR: 80 ML/MIN/1.73M2 — SIGNIFICANT CHANGE UP
GLUCOSE SERPL-MCNC: 79 MG/DL — SIGNIFICANT CHANGE UP (ref 70–99)
HCT VFR BLD CALC: 41.3 % — SIGNIFICANT CHANGE UP (ref 39–50)
HGB BLD-MCNC: 13.8 G/DL — SIGNIFICANT CHANGE UP (ref 13–17)
LIDOCAIN IGE QN: 124 U/L — HIGH (ref 16–77)
MCHC RBC-ENTMCNC: 27.7 PG — SIGNIFICANT CHANGE UP (ref 27–34)
MCHC RBC-ENTMCNC: 33.4 GM/DL — SIGNIFICANT CHANGE UP (ref 32–36)
MCV RBC AUTO: 82.8 FL — SIGNIFICANT CHANGE UP (ref 80–100)
NRBC # BLD: 0 /100 WBCS — SIGNIFICANT CHANGE UP (ref 0–0)
PLATELET # BLD AUTO: 314 K/UL — SIGNIFICANT CHANGE UP (ref 150–400)
POTASSIUM SERPL-MCNC: 4.2 MMOL/L — SIGNIFICANT CHANGE UP (ref 3.5–5.3)
POTASSIUM SERPL-SCNC: 4.2 MMOL/L — SIGNIFICANT CHANGE UP (ref 3.5–5.3)
PROT SERPL-MCNC: 7.3 G/DL — SIGNIFICANT CHANGE UP (ref 6–8.3)
RBC # BLD: 4.99 M/UL — SIGNIFICANT CHANGE UP (ref 4.2–5.8)
RBC # FLD: 12.9 % — SIGNIFICANT CHANGE UP (ref 10.3–14.5)
SODIUM SERPL-SCNC: 141 MMOL/L — SIGNIFICANT CHANGE UP (ref 135–145)
WBC # BLD: 9.05 K/UL — SIGNIFICANT CHANGE UP (ref 3.8–10.5)
WBC # FLD AUTO: 9.05 K/UL — SIGNIFICANT CHANGE UP (ref 3.8–10.5)

## 2024-03-02 PROCEDURE — 99233 SBSQ HOSP IP/OBS HIGH 50: CPT

## 2024-03-02 PROCEDURE — 99232 SBSQ HOSP IP/OBS MODERATE 35: CPT

## 2024-03-02 RX ORDER — INDOMETHACIN 50 MG
50 CAPSULE ORAL ONCE
Refills: 0 | Status: COMPLETED | OUTPATIENT
Start: 2024-03-02 | End: 2024-03-02

## 2024-03-02 RX ORDER — PHENYLEPHRINE-SHARK LIVER OIL-MINERAL OIL-PETROLATUM RECTAL OINTMENT
1 OINTMENT (GRAM) RECTAL ONCE
Refills: 0 | Status: COMPLETED | OUTPATIENT
Start: 2024-03-02 | End: 2024-03-02

## 2024-03-02 RX ADMIN — Medication 50 MILLIGRAM(S): at 19:48

## 2024-03-02 RX ADMIN — SODIUM CHLORIDE 100 MILLILITER(S): 9 INJECTION, SOLUTION INTRAVENOUS at 12:23

## 2024-03-02 RX ADMIN — PHENYLEPHRINE-SHARK LIVER OIL-MINERAL OIL-PETROLATUM RECTAL OINTMENT 1 APPLICATION(S): at 12:15

## 2024-03-02 RX ADMIN — PANTOPRAZOLE SODIUM 40 MILLIGRAM(S): 20 TABLET, DELAYED RELEASE ORAL at 05:55

## 2024-03-02 RX ADMIN — Medication 50 MILLIGRAM(S): at 18:48

## 2024-03-02 RX ADMIN — SODIUM CHLORIDE 100 MILLILITER(S): 9 INJECTION, SOLUTION INTRAVENOUS at 20:56

## 2024-03-02 NOTE — PROGRESS NOTE ADULT - ASSESSMENT
Patient seen and examined in no acute distress sitting up in chair. Patient denies any abd pain, n/v, hematochezia. Last BM on 03/01/24 x2 which patient stated was liquid. He did complain of lot of rectal pain and pressure during BM otherwise no rectal pain or pressure.     Patient several weeks status post cholecystectomy… Did well until acute onset of abdominal pain with evidence of pancreatitis and bile duct dilation on prior imaging studies.  Liver tests are essentially normal.    The patient may have passed biliary sludge, more likely than a delayed bile duct injury.  Plan for ERCP on 03/04/24

## 2024-03-02 NOTE — PROGRESS NOTE ADULT - NS ATTEND AMEND GEN_ALL_CORE FT
Seen and examined. Reports pain has improved. Feels much better.     Gallstone pancreatitis - MRI with filling defect suggestive of retained stone. Plan for ERCP on Monday. GI and surg following.  Clear diet for now. C/w IVF. Reports rectal pain with BM, can try prepH.     Plan discussed personally with ACP

## 2024-03-02 NOTE — PROGRESS NOTE ADULT - ASSESSMENT
33 year old male PMH GERD, cholelithiasis,  s/p Lap perfecto 2/16/24, presents to ED with c/o RUQ pain, admitted for acute pancreatitis     #Pancreatitis  #Rectal pain  -Improving clinically and biochemically but MRCP with filling defect suggestive of stone.  -The patient may have passed biliary sludge, more likely than a delayed bile duct injury.  -ERCP Monday 03/04/24  -Continue clears as tolerated  -pain management  -Preparation H  -IV fluids  -Monitor Lipase (trending down)  -GI and surgery recs      #Leukocytosis  - noted on admission, likely in setting of acute pancreatitis  - now resolved, monitor off abx    #GERD  - continue home famotidine 40mg qhs prn   - home omeprazole substitute with protonix    #DVT ppx:  - SCDs    Plans to update family

## 2024-03-02 NOTE — PROGRESS NOTE ADULT - PROBLEM SELECTOR PLAN 1
Improving clinically and biochemically but MRCP with filling defect suggestive of stone.  -ERCP Monday 03/04/24  -Continue clears as tolerated  -pain managementh  -IV fluids  -Monitor Lipase (trending down) Improving clinically and biochemically but MRCP with filling defect suggestive of stone.  -ERCP Tomorrow  -NPO past mn  -pain managementh  -IV fluids  -Monitor Lipase (trending down)

## 2024-03-03 ENCOUNTER — TRANSCRIPTION ENCOUNTER (OUTPATIENT)
Age: 34
End: 2024-03-03

## 2024-03-03 VITALS
WEIGHT: 199.96 LBS | OXYGEN SATURATION: 100 % | DIASTOLIC BLOOD PRESSURE: 75 MMHG | SYSTOLIC BLOOD PRESSURE: 115 MMHG | HEART RATE: 51 BPM | TEMPERATURE: 98 F | HEIGHT: 70.87 IN | RESPIRATION RATE: 16 BRPM

## 2024-03-03 LAB
ALBUMIN SERPL ELPH-MCNC: 3.5 G/DL — SIGNIFICANT CHANGE UP (ref 3.3–5)
ALP SERPL-CCNC: 79 U/L — SIGNIFICANT CHANGE UP (ref 40–120)
ALT FLD-CCNC: 136 U/L — HIGH (ref 10–45)
ANION GAP SERPL CALC-SCNC: 12 MMOL/L — SIGNIFICANT CHANGE UP (ref 5–17)
AST SERPL-CCNC: 39 U/L — SIGNIFICANT CHANGE UP (ref 10–40)
BILIRUB SERPL-MCNC: 0.7 MG/DL — SIGNIFICANT CHANGE UP (ref 0.2–1.2)
BUN SERPL-MCNC: 9 MG/DL — SIGNIFICANT CHANGE UP (ref 7–23)
CALCIUM SERPL-MCNC: 9.1 MG/DL — SIGNIFICANT CHANGE UP (ref 8.4–10.5)
CHLORIDE SERPL-SCNC: 102 MMOL/L — SIGNIFICANT CHANGE UP (ref 96–108)
CO2 SERPL-SCNC: 26 MMOL/L — SIGNIFICANT CHANGE UP (ref 22–31)
CREAT SERPL-MCNC: 1.27 MG/DL — SIGNIFICANT CHANGE UP (ref 0.5–1.3)
EGFR: 77 ML/MIN/1.73M2 — SIGNIFICANT CHANGE UP
GLUCOSE SERPL-MCNC: 85 MG/DL — SIGNIFICANT CHANGE UP (ref 70–99)
HCT VFR BLD CALC: 43 % — SIGNIFICANT CHANGE UP (ref 39–50)
HGB BLD-MCNC: 14.4 G/DL — SIGNIFICANT CHANGE UP (ref 13–17)
MCHC RBC-ENTMCNC: 27.6 PG — SIGNIFICANT CHANGE UP (ref 27–34)
MCHC RBC-ENTMCNC: 33.5 GM/DL — SIGNIFICANT CHANGE UP (ref 32–36)
MCV RBC AUTO: 82.4 FL — SIGNIFICANT CHANGE UP (ref 80–100)
NRBC # BLD: 0 /100 WBCS — SIGNIFICANT CHANGE UP (ref 0–0)
PLATELET # BLD AUTO: 327 K/UL — SIGNIFICANT CHANGE UP (ref 150–400)
POTASSIUM SERPL-MCNC: 4 MMOL/L — SIGNIFICANT CHANGE UP (ref 3.5–5.3)
POTASSIUM SERPL-SCNC: 4 MMOL/L — SIGNIFICANT CHANGE UP (ref 3.5–5.3)
PROT SERPL-MCNC: 7.7 G/DL — SIGNIFICANT CHANGE UP (ref 6–8.3)
RBC # BLD: 5.22 M/UL — SIGNIFICANT CHANGE UP (ref 4.2–5.8)
RBC # FLD: 12.8 % — SIGNIFICANT CHANGE UP (ref 10.3–14.5)
SODIUM SERPL-SCNC: 140 MMOL/L — SIGNIFICANT CHANGE UP (ref 135–145)
WBC # BLD: 8.67 K/UL — SIGNIFICANT CHANGE UP (ref 3.8–10.5)
WBC # FLD AUTO: 8.67 K/UL — SIGNIFICANT CHANGE UP (ref 3.8–10.5)

## 2024-03-03 PROCEDURE — A9579: CPT

## 2024-03-03 PROCEDURE — 83690 ASSAY OF LIPASE: CPT

## 2024-03-03 PROCEDURE — 76705 ECHO EXAM OF ABDOMEN: CPT

## 2024-03-03 PROCEDURE — 80053 COMPREHEN METABOLIC PANEL: CPT

## 2024-03-03 PROCEDURE — 86901 BLOOD TYPING SEROLOGIC RH(D): CPT

## 2024-03-03 PROCEDURE — 86900 BLOOD TYPING SEROLOGIC ABO: CPT

## 2024-03-03 PROCEDURE — 96375 TX/PRO/DX INJ NEW DRUG ADDON: CPT

## 2024-03-03 PROCEDURE — 74330 X-RAY BILE/PANC ENDOSCOPY: CPT

## 2024-03-03 PROCEDURE — C1773: CPT

## 2024-03-03 PROCEDURE — 74183 MRI ABD W/O CNTR FLWD CNTR: CPT | Mod: MC

## 2024-03-03 PROCEDURE — 99233 SBSQ HOSP IP/OBS HIGH 50: CPT | Mod: 25

## 2024-03-03 PROCEDURE — C9399: CPT

## 2024-03-03 PROCEDURE — C1769: CPT

## 2024-03-03 PROCEDURE — 85027 COMPLETE CBC AUTOMATED: CPT

## 2024-03-03 PROCEDURE — 80076 HEPATIC FUNCTION PANEL: CPT

## 2024-03-03 PROCEDURE — 43262 ENDO CHOLANGIOPANCREATOGRAPH: CPT | Mod: 59

## 2024-03-03 PROCEDURE — 43264 ERCP REMOVE DUCT CALCULI: CPT | Mod: 59

## 2024-03-03 PROCEDURE — 83735 ASSAY OF MAGNESIUM: CPT

## 2024-03-03 PROCEDURE — 36415 COLL VENOUS BLD VENIPUNCTURE: CPT

## 2024-03-03 PROCEDURE — 84100 ASSAY OF PHOSPHORUS: CPT

## 2024-03-03 PROCEDURE — 82248 BILIRUBIN DIRECT: CPT

## 2024-03-03 PROCEDURE — 96374 THER/PROPH/DIAG INJ IV PUSH: CPT

## 2024-03-03 PROCEDURE — 85025 COMPLETE CBC W/AUTO DIFF WBC: CPT

## 2024-03-03 PROCEDURE — 85610 PROTHROMBIN TIME: CPT

## 2024-03-03 PROCEDURE — 99285 EMERGENCY DEPT VISIT HI MDM: CPT

## 2024-03-03 PROCEDURE — 99239 HOSP IP/OBS DSCHRG MGMT >30: CPT

## 2024-03-03 PROCEDURE — 86850 RBC ANTIBODY SCREEN: CPT

## 2024-03-03 PROCEDURE — 82247 BILIRUBIN TOTAL: CPT

## 2024-03-03 DEVICE — CATH BLLN BIL RX 9-12CM: Type: IMPLANTABLE DEVICE | Status: FUNCTIONAL

## 2024-03-03 DEVICE — HYDRATOME 44: Type: IMPLANTABLE DEVICE | Status: FUNCTIONAL

## 2024-03-03 RX ORDER — CEFAZOLIN SODIUM 1 G
1000 VIAL (EA) INJECTION ONCE
Refills: 0 | Status: COMPLETED | OUTPATIENT
Start: 2024-03-03 | End: 2024-03-03

## 2024-03-03 RX ORDER — INDOMETHACIN 50 MG
50 CAPSULE ORAL ONCE
Refills: 0 | Status: DISCONTINUED | OUTPATIENT
Start: 2024-03-03 | End: 2024-03-03

## 2024-03-03 RX ADMIN — Medication 100 MILLIGRAM(S): at 09:47

## 2024-03-03 NOTE — DISCHARGE NOTE PROVIDER - CARE PROVIDER_API CALL
Sunil Smith  Internal Medicine  [id number] [place] [id number]  Phone: [phone]  Fax: [phone]  Follow Up Time:

## 2024-03-03 NOTE — DISCHARGE NOTE PROVIDER - NSDCMRMEDTOKEN_GEN_ALL_CORE_FT
famotidine 40 mg oral tablet: 1 tab(s) orally once a day (at bedtime)  omeprazole 20 mg oral delayed release capsule: 1 cap(s) orally once a day

## 2024-03-03 NOTE — PROGRESS NOTE ADULT - ASSESSMENT
33 year old male PMH GERD, cholelithiasis,  s/p Lap perfecto 2/16/24, presents to ED with c/o RUQ pain, admitted for acute pancreatitis     #Pancreatitis  #Rectal pain  -Improving clinically and biochemically but MRCP with filling defect suggestive of stone.  -The patient may have passed biliary sludge, more likely than a delayed bile duct injury.  -ERCP today  -pain management  -Preparation H  -Monitor Lipase (trending down)  -GI and surgery recs      #Leukocytosis  - noted on admission, likely in setting of acute pancreatitis  - now resolved, monitor off abx    #GERD  - continue home famotidine 40mg qhs prn   - home omeprazole substitute with protonix    #DVT ppx:  - SCDs    Plans to update family     Dispo: Home likely tomorrow

## 2024-03-03 NOTE — PROGRESS NOTE ADULT - REASON FOR ADMISSION
pancreatitis

## 2024-03-03 NOTE — PROGRESS NOTE ADULT - NS ATTEND AMEND GEN_ALL_CORE FT
Seen and examined. Reports pain has improved. Feels much better. Wife at bedside.    Gallstone pancreatitis   Choledocholithiasis  MRI with filling defect suggestive of retained stone. Plan for ERCP today. GI and surg following.    Plan discussed personally with ACP Seen and examined. Reports pain has improved. Feels much better. Wife at bedside.    Gallstone pancreatitis   Choledocholithiasis  MRI with filling defect suggestive of retained stone. Plan for ERCP today. GI and surg following.  Possible DC later today if tolerating PO as per GI recommendations.     Plan discussed personally with ACP

## 2024-03-03 NOTE — DISCHARGE NOTE PROVIDER - NSDCCPCAREPLAN_GEN_ALL_CORE_FT
PRINCIPAL DISCHARGE DIAGNOSIS  Diagnosis: Pancreatitis  Assessment and Plan of Treatment: Follow up with your GI team and their instructions. Return to the ED for worsening abdominal pain or worsening symptoms.

## 2024-03-03 NOTE — PROGRESS NOTE ADULT - PROVIDER SPECIALTY LIST ADULT
Hospitalist
Hospitalist
Gastroenterology
Hospitalist
Surgery
Gastroenterology
Hospitalist
Gastroenterology

## 2024-03-03 NOTE — PROGRESS NOTE ADULT - TIME BILLING
Time spent includes direct patient care  (interview and examination of patient), discussion with other providers, support staff and/or patient's family members, review of medical records, ordering diagnostic tests and analyzing results, and documentation.

## 2024-03-03 NOTE — PROGRESS NOTE ADULT - SUBJECTIVE AND OBJECTIVE BOX
SUBJECTIVE:   Seen and examined at bedside. no acute events overnight. Denies pain, SOB, nausea/vomiting. passing flatus.     OBJECTIVE: T(C): 36.3 (03-01-24 @ 06:36), Max: 37.2 (02-29-24 @ 20:22)  HR: 46 (03-01-24 @ 06:36) (46 - 88)  BP: 122/78 (03-01-24 @ 06:36) (122/78 - 149/89)  RR: 18 (03-01-24 @ 06:36) (16 - 18)  SpO2: 98% (03-01-24 @ 06:36) (98% - 98%)  Wt(kg): --  I&O's Summary    29 Feb 2024 07:01  -  01 Mar 2024 07:00  --------------------------------------------------------  IN: 0 mL / OUT: 1 mL / NET: -1 mL      I&O's Detail    29 Feb 2024 07:01  -  01 Mar 2024 07:00  --------------------------------------------------------  IN:  Total IN: 0 mL    OUT:    Blood Loss (mL): 1 mL  Total OUT: 1 mL    Total NET: -1 mL      Physical Exam  General: NAD  Resp: nonlabored   Abdomen: soft, nontender, nondistended. incisions well healed   Vasc: WWP    MEDICATIONS  (STANDING):  lactated ringers. 1000 milliLiter(s) (150 mL/Hr) IV Continuous <Continuous>  pantoprazole  Injectable 40 milliGRAM(s) IV Push daily    MEDICATIONS  (PRN):  acetaminophen     Tablet .. 650 milliGRAM(s) Oral every 6 hours PRN Temp greater or equal to 38C (100.4F), Mild Pain (1 - 3)  aluminum hydroxide/magnesium hydroxide/simethicone Suspension 30 milliLiter(s) Oral every 4 hours PRN Dyspepsia  famotidine    Tablet 40 milliGRAM(s) Oral at bedtime PRN GERD  melatonin 3 milliGRAM(s) Oral at bedtime PRN Insomnia  ondansetron Injectable 4 milliGRAM(s) IV Push every 8 hours PRN Nausea and/or Vomiting      LABS:                        12.9   9.65  )-----------( 313      ( 29 Feb 2024 07:47 )             39.9     02-29    140  |  103  |  9   ----------------------------<  87  3.6   |  28  |  0.93    Ca    8.8      29 Feb 2024 07:47  Phos  3.4     02-29  Mg     1.8     02-29    TPro  7.2  /  Alb  3.1<L>  /  TBili  0.3  /  DBili  0.1  /  AST  77<H>  /  ALT  249<H>  /  AlkPhos  79  02-29    PT/INR - ( 29 Feb 2024 07:47 )   PT: 11.3 sec;   INR: 0.96 ratio           Urinalysis Basic - ( 29 Feb 2024 07:47 )    Color: x / Appearance: x / SG: x / pH: x  Gluc: 87 mg/dL / Ketone: x  / Bili: x / Urobili: x   Blood: x / Protein: x / Nitrite: x   Leuk Esterase: x / RBC: x / WBC x   Sq Epi: x / Non Sq Epi: x / Bacteria: x        
Chief Complaint:  Patient is a 33y old  Male who presents with a chief complaint of pancreatitis now clinically improving      HPI:  Post perfecto pain n/v with adm for pancreatitis    Allergies:  No Known Allergies      Medications:    MEDICATIONS  (STANDING):  lactated ringers. 1000 milliLiter(s) (100 mL/Hr) IV Continuous <Continuous>  pantoprazole    Tablet 40 milliGRAM(s) Oral before breakfast    MEDICATIONS  (PRN):  acetaminophen     Tablet .. 650 milliGRAM(s) Oral every 6 hours PRN Temp greater or equal to 38C (100.4F), Mild Pain (1 - 3)  aluminum hydroxide/magnesium hydroxide/simethicone Suspension 30 milliLiter(s) Oral every 4 hours PRN Dyspepsia  famotidine    Tablet 40 milliGRAM(s) Oral at bedtime PRN GERD  melatonin 3 milliGRAM(s) Oral at bedtime PRN Insomnia  ondansetron Injectable 4 milliGRAM(s) IV Push every 8 hours PRN Nausea and/or Vomiting      PMHX/PSHX:  Chronic abdominal pain    No pertinent past medical history    No pertinent past medical history    GERD (gastroesophageal reflux disease)    Constipation    Seasonal rhinitis    No significant past surgical history    No significant past surgical history    History of appendectomy    History of colonoscopy    History of endoscopy        Family history:  No pertinent family history in first degree relatives    No pertinent family history in first degree relatives        Social History:     ROS:     General:  No wt loss, fevers, chills, night sweats, fatigue,   Eyes:  Good vision, no reported pain  ENT:  No sore throat, pain, runny nose, dysphagia  CV:  No pain, palpitations, hypo/hypertension  Resp:  No dyspnea, cough, tachypnea, wheezing  GI: per note and No pruritis, No rectal bleeding, No tarry stools, No dysphagia,  :  No pain, bleeding, incontinence, nocturia  Neuro:  No weakness, tingling, memory problems  Psych:  No fatigue, insomnia, mood problems, depression  Endocrine:  No polyuria, polydipsia, cold/heat intolerance  Heme:  No petechiae, ecchymosis, easy bruisability  Skin:  No rash    PHYSICAL EXAM:   Vital Signs Last 24 Hrs  T(C): 36.7 (02 Mar 2024 06:05), Max: 37.2 (01 Mar 2024 11:54)  T(F): 98.1 (02 Mar 2024 06:05), Max: 99 (01 Mar 2024 11:54)  HR: 48 (02 Mar 2024 06:05) (48 - 61)  BP: 119/80 (02 Mar 2024 06:05) (116/75 - 120/76)  BP(mean): --  RR: 18 (02 Mar 2024 06:05) (18 - 18)  SpO2: 96% (02 Mar 2024 06:05) (96% - 96%)    Parameters below as of 02 Mar 2024 06:05  Patient On (Oxygen Delivery Method): room air      GENERAL:  Appears stated age, well-groomed, well-nourished, no distress  HEENT:  NC/AT,  conjunctivae clear and pink  CHEST:  Full & symmetric excursion, no increased effort, breath sounds clear  HEART:  Regular rhythm  ABDOMEN:  Soft, non-tender, non-distended, normoactive bowel sounds  EXTREMITIES:  no cyanosis,clubbing or edema  SKIN:  warm/dry  NEURO:  Alert, oriented      LABS:                          13.8   9.05  )-----------( 314      ( 02 Mar 2024 05:37 )             41.3       03-02    141  |  104  |  9   ----------------------------<  79  4.2   |  29  |  1.24    Ca    9.3      02 Mar 2024 05:37    TPro  7.3  /  Alb  3.2<L>  /  TBili  0.5  /  DBili  0.1  /  AST  53<H>  /  ALT  163<H>  /  AlkPhos  83  03-02                        
INTERVAL HPI/OVERNIGHT EVENTS:  Pt. seen and examined at bedside resting comfortably. Patient denies any pain currently and states epigastric pain significantly improved today. Admits to poor appetite and has not eaten for 3 days, does not feel hungry yet. Waiting for pickup for MRCP. +flatus. No N/V.   Denies fever/chills, chest pain, dyspnea, cough, dizziness.     Vital Signs Last 24 Hrs  T(C): 36.9 (29 Feb 2024 11:26), Max: 37.1 (28 Feb 2024 22:00)  T(F): 98.4 (29 Feb 2024 11:26), Max: 98.8 (28 Feb 2024 22:00)  HR: 57 (29 Feb 2024 11:26) (57 - 82)  BP: 149/89 (29 Feb 2024 11:26) (121/75 - 149/89)  RR: 16 (29 Feb 2024 11:26) (16 - 20)  SpO2: 98% (29 Feb 2024 11:26) (98% - 99%)    Parameters below as of 29 Feb 2024 11:26  Patient On (Oxygen Delivery Method): room air      PHYSICAL EXAM:    GENERAL: NAD  HEAD:  Atraumatic, Normocephalic  EYES: EOMI, PERRLA, conjunctiva and sclera clear  CHEST/LUNG: Clear to ausculation, bilaterally   HEART: S1S2, RRR  ABDOMEN: Laparoscopic port sites CDI, healing well. non distended, +BS, soft, non tender, no guarding  EXTREMITIES:  calf soft, non tender b/l. 2+ distal pulses b/l.     I&O's Detail      LABS:                        12.9   9.65  )-----------( 313      ( 29 Feb 2024 07:47 )             39.9     02-29    140  |  103  |  9   ----------------------------<  87  3.6   |  28  |  0.93    Ca    8.8      29 Feb 2024 07:47  Phos  3.4     02-29  Mg     1.8     02-29    TPro  7.2  /  Alb  3.1<L>  /  TBili  0.3  /  DBili  0.1  /  AST  77<H>  /  ALT  249<H>  /  AlkPhos  79  02-29    PT/INR - ( 29 Feb 2024 07:47 )   PT: 11.3 sec;   INR: 0.96 ratio           Urinalysis Basic - ( 29 Feb 2024 07:47 )    Color: x / Appearance: x / SG: x / pH: x  Gluc: 87 mg/dL / Ketone: x  / Bili: x / Urobili: x   Blood: x / Protein: x / Nitrite: x   Leuk Esterase: x / RBC: x / WBC x   Sq Epi: x / Non Sq Epi: x / Bacteria: x      RADIOLOGY & ADDITIONAL STUDIES:  < from: US Abdomen Upper Quadrant Right (02.29.24 @ 10:39) >  IMPRESSION:  Hepatic steatosis.  No evidence of biliary obstruction. Consider MRCP in view of clinical   concern.    --- End of Report ---        Impression: 33M with PMH GERD and Cholelithiasis, s/p Lap perfecto 2/16 with Dr. Valenzuela now admitted with epigastric abdominal pain, and increased LFTs/Lipase - likely pancreatitis due to ?passed stone  LFTs/Lipase improving, and patient clinically improving  Abd US: no choledocho and nondilated CBD    -NPO/IVF  -F/u MRCP - if NML, can advance diet as tolerated and d/c home in AM if tolerating diet                   -If + for CBD then f/u with GI for ERCP  -serial abd exams, pain management PRN  -Ambulate/OOB as tolerated  -Discussed with Dr. Valenzuela and Hospitalist  
SURGERY PROGRESS NOTE  Pt. seen and examined at bedside resting comfortably. NAEO. No acute complaints. Pt tolerating CLD, admits to some nausea but improved w/ zofran. Voiding freely. Reports improved abdominal pain.   Denies fever/chills, chest pain, dyspnea, cough, dizziness.     Vital Signs Last 24 Hrs  T(C): 37 (02 Mar 2024 12:31), Max: 37 (02 Mar 2024 12:31)  T(F): 98.6 (02 Mar 2024 12:31), Max: 98.6 (02 Mar 2024 12:31)  HR: 65 (02 Mar 2024 12:31) (48 - 65)  BP: 100/67 (02 Mar 2024 12:31) (100/67 - 119/80)  BP(mean): --  RR: 17 (02 Mar 2024 12:31) (17 - 18)  SpO2: 99% (02 Mar 2024 12:31) (96% - 99%)    Parameters below as of 02 Mar 2024 12:31  Patient On (Oxygen Delivery Method): room air      PHYSICAL EXAM:    GENERAL: NAD  HEAD:  Atraumatic, Normocephalic  EYES: EOMI, PERRLA, conjunctiva and sclera clear  ABDOMEN: soft, ND, NT, no guarding present  EXTREMITIES:  calf soft, non tender b/l    I&O's Detail      LABS:                        13.8   9.05  )-----------( 314      ( 02 Mar 2024 05:37 )             41.3     03-02    141  |  104  |  9   ----------------------------<  79  4.2   |  29  |  1.24    Ca    9.3      02 Mar 2024 05:37    TPro  7.3  /  Alb  3.2<L>  /  TBili  0.5  /  DBili  0.1  /  AST  53<H>  /  ALT  163<H>  /  AlkPhos  83  03-02      Urinalysis Basic - ( 02 Mar 2024 05:37 )    Color: x / Appearance: x / SG: x / pH: x  Gluc: 79 mg/dL / Ketone: x  / Bili: x / Urobili: x   Blood: x / Protein: x / Nitrite: x   Leuk Esterase: x / RBC: x / WBC x   Sq Epi: x / Non Sq Epi: x / Bacteria: x        type    RADIOLOGY & ADDITIONAL STUDIES:    < from: MR MRCP w/wo IV Cont (03.01.24 @ 14:20) >  ACC: [id number] EXAM:  MR MRCP WAW IC   ORDERED BY: ALEYDA OSBORN     PROCEDURE DATE:  03/01/2024          INTERPRETATION:  CLINICAL INFORMATION: Epigastric pain and elevated LFTs   status post laparoscopic cholecystectomy.    COMPARISON: None.    CONTRAST/COMPLICATIONS:  IV Contrast: Gadavist  9 cc administered   1 cc discarded  Oral Contrast: NONE  Complications: None reported at time of study completion    PROCEDURE:  MRI of the abdomen was performed.  MRCP was performed.    FINDINGS:  LOWERCHEST: Within normal limits.    LIVER: Within normal limits.  BILE DUCTS: Normal caliber. CBD measures approximately 6 mm in maximum   diameter. There is a filling defect within the distal CBD measuring   approximately 7 mm. This may represent a common duct stone, mass or an   artifact.  GALLBLADDER: Cholecystectomy.  SPLEEN: Within normal limits.  PANCREAS: Within normal limits. No evidence of any edematous change   involving the pancreas. No peripancreatic edema or inflammatory change.   Normal-appearing pancreatic duct.  ADRENALS: Within normal limits.  KIDNEYS/URETERS: Within normal limits.    VISUALIZED PORTIONS:  BOWEL: Within normal limits.  PERITONEUM: No ascites.  VESSELS: Within normal limits.  RETROPERITONEUM/LYMPH NODES: No lymphadenopathy.  ABDOMINAL WALL: Postsurgical change.  BONES: Within normal limits.    IMPRESSION:  Evidence of a filling defect within the distal CBD without any upstream   biliary dilatation as described above.        --- End of Report ---            TYLER RESENDIZ MD; Attending Radiologist  This document has been electronically signed. Mar  1 2024  3:12PM    < end of copied text >      Impression: 33 year old male PMH GERD, cholelithiasis, s/p Lap perfecto 2/16/24, presented to ED with c/o RUQ pain, admitted for acute pancreatitis. MRCP with possible retained cbd stone. GI planning for ERCP monday 3/4. Pt HD stable. Abd exam stable.    Plan:  - GI plan for ERCP monday 3/4. If develops fever ,chills, jaundice, abdominal pain may warrant urgent ERCP  - Clear w/ ensure clears  - Medical management per primary team    Seen and discussed w/ covering surgeon Dr. Osborn  Surgery
Patient is a 33y old  Male who presents with a chief complaint of pancreatitis (28 Feb 2024 20:52)      Patient seen and examined at bedside. No events overnight, feels better now. States abd pain now resolved, but has nausea and episode of NBNB emesis this morning. Denies fever, chills, sob, chest pain. He is worried about retained stone and wants further imaging prior to being discharged. States he was feeling better since after lap perfecto earlier this month and then all of a sudden developed pain. Otherwise appears well     ALLERGIES:  No Known Allergies    MEDICATIONS  (STANDING):  lactated ringers. 1000 milliLiter(s) (150 mL/Hr) IV Continuous <Continuous>  pantoprazole  Injectable 40 milliGRAM(s) IV Push daily    MEDICATIONS  (PRN):  acetaminophen     Tablet .. 650 milliGRAM(s) Oral every 6 hours PRN Temp greater or equal to 38C (100.4F), Mild Pain (1 - 3)  aluminum hydroxide/magnesium hydroxide/simethicone Suspension 30 milliLiter(s) Oral every 4 hours PRN Dyspepsia  famotidine    Tablet 40 milliGRAM(s) Oral at bedtime PRN GERD  melatonin 3 milliGRAM(s) Oral at bedtime PRN Insomnia  morphine  - Injectable 2 milliGRAM(s) IV Push every 4 hours PRN Moderate Pain (4 - 6)  morphine  - Injectable 4 milliGRAM(s) IV Push every 4 hours PRN Severe Pain (7 - 10)  ondansetron Injectable 4 milliGRAM(s) IV Push every 8 hours PRN Nausea and/or Vomiting    Vital Signs Last 24 Hrs  T(F): 97.8 (29 Feb 2024 06:00), Max: 98.8 (28 Feb 2024 22:00)  HR: 82 (29 Feb 2024 06:00) (62 - 82)  BP: 132/68 (29 Feb 2024 06:00) (121/75 - 132/68)  RR: 20 (29 Feb 2024 06:00) (17 - 20)  SpO2: 98% (29 Feb 2024 06:00) (98% - 99%)  I&O's Summary      PHYSICAL EXAM:  GENERAL: NAD, well-groomed, well-developed  HEAD:  Atraumatic, Normocephalic  EYES: PEERL, conjunctiva and sclera clear  ENMT: Moist mucous membranes, Good dentition, no thrush  NECK: Supple, No JVD  CHEST/LUNG: Clear to auscultation bilaterally, good air entry, non-labored breathing  HEART: RRR; S1/S2, No murmur  ABDOMEN: Soft, Nontender, Nondistended; Bowel sounds present  EXTREMITIES: No calf tenderness, No cyanosis, No edema  SKIN: Warm, Intact  PSYCH: Normal mood, Normal affect  NERVOUS SYSTEM:  A/O x3, Good concentration    LABS:                        12.9   9.65  )-----------( 313      ( 29 Feb 2024 07:47 )             39.9     02-28    140  |  104  |  12  ----------------------------<  91  4.2   |  28  |  0.98    Ca    8.7      28 Feb 2024 18:55    TPro  x   /  Alb  x   /  TBili  0.2  /  DBili  0.1  /  AST  x   /  ALT  x   /  AlkPhos  x   02-28      PT/INR - ( 29 Feb 2024 07:47 )   PT: 11.3 sec;   INR: 0.96 ratio                                   Urinalysis Basic - ( 28 Feb 2024 18:55 )    Color: x / Appearance: x / SG: x / pH: x  Gluc: 91 mg/dL / Ketone: x  / Bili: x / Urobili: x   Blood: x / Protein: x / Nitrite: x   Leuk Esterase: x / RBC: x / WBC x   Sq Epi: x / Non Sq Epi: x / Bacteria: x            RADIOLOGY & ADDITIONAL TESTS:    Care Discussed with Consultants/Other Providers:   
Patient is a 33y old  Male who presents with a chief complaint of pancreatitis (01 Mar 2024 07:31)    Patient seen and examined at bedside.  no acute overnight events    ALLERGIES:  No Known Allergies        Vital Signs Last 24 Hrs  T(F): 97.3 (01 Mar 2024 06:36), Max: 99 (29 Feb 2024 20:22)  HR: 46 (01 Mar 2024 06:36) (46 - 88)  BP: 122/78 (01 Mar 2024 06:36) (122/78 - 149/89)  RR: 18 (01 Mar 2024 06:36) (16 - 18)  SpO2: 98% (01 Mar 2024 06:36) (98% - 98%)  I&O's Summary    29 Feb 2024 07:01  -  01 Mar 2024 07:00  --------------------------------------------------------  IN: 0 mL / OUT: 1 mL / NET: -1 mL      MEDICATIONS:  acetaminophen     Tablet .. 650 milliGRAM(s) Oral every 6 hours PRN  aluminum hydroxide/magnesium hydroxide/simethicone Suspension 30 milliLiter(s) Oral every 4 hours PRN  famotidine    Tablet 40 milliGRAM(s) Oral at bedtime PRN  lactated ringers. 1000 milliLiter(s) IV Continuous <Continuous>  melatonin 3 milliGRAM(s) Oral at bedtime PRN  ondansetron Injectable 4 milliGRAM(s) IV Push every 8 hours PRN  pantoprazole  Injectable 40 milliGRAM(s) IV Push daily      PHYSICAL EXAM:  General: NAD, A/O x 3  ENT: MMM, no thrush  Neck: Supple, No JVD  Lungs: Clear to auscultation bilaterally, non labored, good air entry  Cardio: RRR, S1/S2, No murmurs  Abdomen: Soft, Nontender, Nondistended; Bowel sounds present  Extremities: No cyanosis, No edema    LABS:                        12.9   8.53  )-----------( 214      ( 01 Mar 2024 06:08 )             40.7     02-29    140  |  103  |  9   ----------------------------<  87  3.6   |  28  |  0.93    Ca    8.8      29 Feb 2024 07:47  Phos  3.4     02-29  Mg     1.8     02-29    TPro  7.2  /  Alb  3.1  /  TBili  0.3  /  DBili  0.1  /  AST  77  /  ALT  249  /  AlkPhos  79  02-29      PT/INR - ( 29 Feb 2024 07:47 )   PT: 11.3 sec;   INR: 0.96 ratio                                   Urinalysis Basic - ( 29 Feb 2024 07:47 )    Color: x / Appearance: x / SG: x / pH: x  Gluc: 87 mg/dL / Ketone: x  / Bili: x / Urobili: x   Blood: x / Protein: x / Nitrite: x   Leuk Esterase: x / RBC: x / WBC x   Sq Epi: x / Non Sq Epi: x / Bacteria: x            RADIOLOGY & ADDITIONAL TESTS:    Care Discussed with Consultants/Other Providers:   
  Chief Complaint:  Patient is a 33y old  Male who presents with a chief complaint of pancreatitis now clinically improving      HPI:  Post perfecto pain n/v with adm for pancreatitis    Allergies:  No Known Allergies      Medications:  acetaminophen     Tablet .. 650 milliGRAM(s) Oral every 6 hours PRN  aluminum hydroxide/magnesium hydroxide/simethicone Suspension 30 milliLiter(s) Oral every 4 hours PRN  famotidine    Tablet 40 milliGRAM(s) Oral at bedtime PRN  lactated ringers. 1000 milliLiter(s) IV Continuous <Continuous>  melatonin 3 milliGRAM(s) Oral at bedtime PRN  ondansetron Injectable 4 milliGRAM(s) IV Push every 8 hours PRN  pantoprazole  Injectable 40 milliGRAM(s) IV Push daily      PMHX/PSHX:  Chronic abdominal pain    No pertinent past medical history    No pertinent past medical history    GERD (gastroesophageal reflux disease)    Constipation    Seasonal rhinitis    No significant past surgical history    No significant past surgical history    History of appendectomy    History of colonoscopy    History of endoscopy        Family history:  No pertinent family history in first degree relatives    No pertinent family history in first degree relatives        Social History:     ROS:     General:  No wt loss, fevers, chills, night sweats, fatigue,   Eyes:  Good vision, no reported pain  ENT:  No sore throat, pain, runny nose, dysphagia  CV:  No pain, palpitations, hypo/hypertension  Resp:  No dyspnea, cough, tachypnea, wheezing  GI: per note and No pruritis, No rectal bleeding, No tarry stools, No dysphagia,  :  No pain, bleeding, incontinence, nocturia  Neuro:  No weakness, tingling, memory problems  Psych:  No fatigue, insomnia, mood problems, depression  Endocrine:  No polyuria, polydipsia, cold/heat intolerance  Heme:  No petechiae, ecchymosis, easy bruisability  Skin:  No rash    PHYSICAL EXAM:   Vital Signs:  Vital Signs Last 24 Hrs  T(C): 36.9 (29 Feb 2024 11:26), Max: 37.1 (28 Feb 2024 22:00)  T(F): 98.4 (29 Feb 2024 11:26), Max: 98.8 (28 Feb 2024 22:00)  HR: 57 (29 Feb 2024 11:26) (57 - 82)  BP: 149/89 (29 Feb 2024 11:26) (121/75 - 149/89)  BP(mean): --  RR: 16 (29 Feb 2024 11:26) (16 - 20)  SpO2: 98% (29 Feb 2024 11:26) (98% - 99%)    Parameters below as of 29 Feb 2024 11:26  Patient On (Oxygen Delivery Method): room air      Daily     Daily     GENERAL:  Appears stated age, well-groomed, well-nourished, no distress  HEENT:  NC/AT,  conjunctivae clear and pink, no thyromegaly, nodules, adenopathy, no JVD, sclera -anicteric  CHEST:  Full & symmetric excursion, no increased effort, breath sounds clear  HEART:  Regular rhythm  ABDOMEN:  Soft, non-tender, non-distended, normoactive bowel sounds,  no masses , no hepatosplenomegaly, no signs of chronic liver disease  EXTREMITIES:  no cyanosis,clubbing or edema  SKIN:  No rash/erythema/ecchymoses/petechiae/wounds/abscess/warm/dry  NEURO:  Alert, oriented, no asterixis, no tremor, no encephalopathy    LABS:             03-01    141  |  105  |  9   ----------------------------<  79  4.2   |  26  |  0.88    Ca    9.0      01 Mar 2024 06:08  Phos  3.4     02-29  Mg     1.8     02-29    TPro  6.7  /  Alb  2.9<L>  /  TBili  0.3  /  DBili  x   /  AST  37  /  ALT  163<H>  /  AlkPhos  73  03-01                            12.9   8.53  )-----------( 214      ( 01 Mar 2024 06:08 )             40.7   
Chief Complaint:  Patient is a 33y old  Male who presents with a chief complaint of pancreatitis now clinically improving      HPI:  Post perfecto pain n/v with adm for pancreatitis    Allergies:  No Known Allergies      Medications:    MEDICATIONS  (STANDING):  lactated ringers. 1000 milliLiter(s) (100 mL/Hr) IV Continuous <Continuous>  pantoprazole    Tablet 40 milliGRAM(s) Oral before breakfast    MEDICATIONS  (PRN):  acetaminophen     Tablet .. 650 milliGRAM(s) Oral every 6 hours PRN Temp greater or equal to 38C (100.4F), Mild Pain (1 - 3)  aluminum hydroxide/magnesium hydroxide/simethicone Suspension 30 milliLiter(s) Oral every 4 hours PRN Dyspepsia  famotidine    Tablet 40 milliGRAM(s) Oral at bedtime PRN GERD  melatonin 3 milliGRAM(s) Oral at bedtime PRN Insomnia  ondansetron Injectable 4 milliGRAM(s) IV Push every 8 hours PRN Nausea and/or Vomiting      PMHX/PSHX:  Chronic abdominal pain    No pertinent past medical history    No pertinent past medical history    GERD (gastroesophageal reflux disease)    Constipation    Seasonal rhinitis    No significant past surgical history    No significant past surgical history    History of appendectomy    History of colonoscopy    History of endoscopy        Family history:  No pertinent family history in first degree relatives    No pertinent family history in first degree relatives        Social History:     ROS:     General:  No wt loss, fevers, chills, night sweats, fatigue,   Eyes:  Good vision, no reported pain  ENT:  No sore throat, pain, runny nose, dysphagia  CV:  No pain, palpitations, hypo/hypertension  Resp:  No dyspnea, cough, tachypnea, wheezing  GI: per note and No pruritis, No rectal bleeding, No tarry stools, No dysphagia,  :  No pain, bleeding, incontinence, nocturia  Neuro:  No weakness, tingling, memory problems  Psych:  No fatigue, insomnia, mood problems, depression  Endocrine:  No polyuria, polydipsia, cold/heat intolerance  Heme:  No petechiae, ecchymosis, easy bruisability  Skin:  No rash    PHYSICAL EXAM:   ICU Vital Signs Last 24 Hrs  T(C): 36.6 (03 Mar 2024 09:03), Max: 36.8 (02 Mar 2024 22:00)  T(F): 97.8 (03 Mar 2024 09:03), Max: 98.3 (02 Mar 2024 22:00)  HR: 51 (03 Mar 2024 09:03) (51 - 57)  BP: 115/75 (03 Mar 2024 09:03) (112/69 - 115/75)  BP(mean): --  ABP: --  ABP(mean): --  RR: 16 (03 Mar 2024 09:03) (15 - 17)  SpO2: 100% (03 Mar 2024 09:03) (99% - 100%)    O2 Parameters below as of 03 Mar 2024 05:00  Patient On (Oxygen Delivery Method): room air            GENERAL:  Appears stated age, well-groomed, well-nourished, no distress  HEENT:  NC/AT,  conjunctivae clear and pink  CHEST:  Full & symmetric excursion, no increased effort, breath sounds clear  HEART:  Regular rhythm  ABDOMEN:  Soft, non-tender, non-distended, normoactive bowel sounds  EXTREMITIES:  no cyanosis,clubbing or edema  SKIN:  warm/dry  NEURO:  Alert, oriented      LABS:                        14.4   8.67  )-----------( 327      ( 03 Mar 2024 08:23 )             43.0     03-03    140  |  102  |  9   ----------------------------<  85  4.0   |  26  |  1.27    Ca    9.1      03 Mar 2024 08:23    TPro  7.7  /  Alb  3.5  /  TBili  0.7  /  DBili  x   /  AST  39  /  ALT  136<H>  /  AlkPhos  79  03-03                          13.8   9.05  )-----------( 314      ( 02 Mar 2024 05:37 )             41.3       03-02    141  |  104  |  9   ----------------------------<  79  4.2   |  29  |  1.24    Ca    9.3      02 Mar 2024 05:37    TPro  7.3  /  Alb  3.2<L>  /  TBili  0.5  /  DBili  0.1  /  AST  53<H>  /  ALT  163<H>  /  AlkPhos  83  03-02                        
Patient is a 33y old  Male who presents with a chief complaint of pancreatitis (02 Mar 2024 10:37)      Patient seen and examined at bedside. Pt states he has rectal pain with BM's otherwise feels well, denies overnight events or further complaints including chest pain, shortness of breath, dizziness, nausea, vomiting, diarrhea, fever or chills.      ALLERGIES:  No Known Allergies    MEDICATIONS  (STANDING):  lactated ringers. 1000 milliLiter(s) (100 mL/Hr) IV Continuous <Continuous>  pantoprazole    Tablet 40 milliGRAM(s) Oral before breakfast    MEDICATIONS  (PRN):  acetaminophen     Tablet .. 650 milliGRAM(s) Oral every 6 hours PRN Temp greater or equal to 38C (100.4F), Mild Pain (1 - 3)  aluminum hydroxide/magnesium hydroxide/simethicone Suspension 30 milliLiter(s) Oral every 4 hours PRN Dyspepsia  famotidine    Tablet 40 milliGRAM(s) Oral at bedtime PRN GERD  melatonin 3 milliGRAM(s) Oral at bedtime PRN Insomnia  ondansetron Injectable 4 milliGRAM(s) IV Push every 8 hours PRN Nausea and/or Vomiting    Vital Signs Last 24 Hrs  T(F): 98.1 (02 Mar 2024 06:05), Max: 99 (01 Mar 2024 11:54)  HR: 48 (02 Mar 2024 06:05) (48 - 61)  BP: 119/80 (02 Mar 2024 06:05) (116/75 - 120/76)  RR: 18 (02 Mar 2024 06:05) (18 - 18)  SpO2: 96% (02 Mar 2024 06:05) (96% - 96%)  I&O's Summary    PHYSICAL EXAM:  General: NAD, A/O x 3  ENT: MMM, no oral thrush   Neck: Supple, No JVD  Lungs: Clear to auscultation bilaterally, non labored breathing  Cardio: RRR, S1/S2, No murmurs  Abdomen: Soft, Nontender, Nondistended; Bowel sounds present  Extremities: No calf tenderness, No pitting edema    LABS:                        13.8   9.05  )-----------( 314      ( 02 Mar 2024 05:37 )             41.3     03-02    141  |  104  |  9   ----------------------------<  79  4.2   |  29  |  1.24    Ca    9.3      02 Mar 2024 05:37  Phos  3.4     02-29  Mg     1.8     02-29    TPro  7.3  /  Alb  3.2  /  TBili  0.5  /  DBili  0.1  /  AST  53  /  ALT  163  /  AlkPhos  83  03-02      PT/INR - ( 29 Feb 2024 07:47 )   PT: 11.3 sec;   INR: 0.96 ratio             Urinalysis Basic - ( 02 Mar 2024 05:37 )    Color: x / Appearance: x / SG: x / pH: x  Gluc: 79 mg/dL / Ketone: x  / Bili: x / Urobili: x   Blood: x / Protein: x / Nitrite: x   Leuk Esterase: x / RBC: x / WBC x   Sq Epi: x / Non Sq Epi: x / Bacteria: x            RADIOLOGY & ADDITIONAL TESTS:  < from: MR MRCP w/wo IV Cont (03.01.24 @ 14:20) >  IMPRESSION:  Evidence of a filling defect within the distal CBD without any upstream   biliary dilatation as described above.        --- End of Report ---            TYLER RESENDIZ MD; Attending Radiologist  This document has been electronically signed. Mar  1 2024  3:12PM    < end of copied text >    Care Discussed with Consultants/Other Providers:   
Patient is a 33y old  Male who presents with a chief complaint of pancreatitis     Patient seen and examined at bedside. No overnight events or current complaints including chest pain, shortness of breath, dizziness, nausea, vomiting, diarrhea, fever or chills.    ALLERGIES:  No Known Allergies    MEDICATIONS  (STANDING):  indomethacin Suppository 50 milliGRAM(s) Rectal once  lactated ringers. 1000 milliLiter(s) (100 mL/Hr) IV Continuous <Continuous>  pantoprazole    Tablet 40 milliGRAM(s) Oral before breakfast    MEDICATIONS  (PRN):  acetaminophen     Tablet .. 650 milliGRAM(s) Oral every 6 hours PRN Temp greater or equal to 38C (100.4F), Mild Pain (1 - 3)  aluminum hydroxide/magnesium hydroxide/simethicone Suspension 30 milliLiter(s) Oral every 4 hours PRN Dyspepsia  famotidine    Tablet 40 milliGRAM(s) Oral at bedtime PRN GERD  melatonin 3 milliGRAM(s) Oral at bedtime PRN Insomnia  ondansetron Injectable 4 milliGRAM(s) IV Push every 8 hours PRN Nausea and/or Vomiting    Vital Signs Last 24 Hrs  T(F): 97.8 (03 Mar 2024 09:03), Max: 98.6 (02 Mar 2024 12:31)  HR: 51 (03 Mar 2024 09:03) (51 - 65)  BP: 115/75 (03 Mar 2024 09:03) (100/67 - 115/75)  RR: 16 (03 Mar 2024 09:03) (15 - 17)  SpO2: 100% (03 Mar 2024 09:03) (99% - 100%)  I&O's Summary    PHYSICAL EXAM:  General: NAD, A/O x 3  ENT: MMM, no oral thrush   Neck: Supple, No JVD  Lungs: Clear to auscultation bilaterally, non labored breathing  Cardio: RRR, S1/S2, No murmurs  Abdomen: Soft, Nontender, Nondistended; Bowel sounds present  Extremities: No calf tenderness, No pitting edema    LABS:                        14.4   8.67  )-----------( 327      ( 03 Mar 2024 08:23 )             43.0     03-03    140  |  102  |  9   ----------------------------<  85  4.0   |  26  |  1.27    Ca    9.1      03 Mar 2024 08:23    TPro  7.7  /  Alb  3.5  /  TBili  0.7  /  DBili  x   /  AST  39  /  ALT  136  /  AlkPhos  79  03-03          Urinalysis Basic - ( 03 Mar 2024 08:23 )    Color: x / Appearance: x / SG: x / pH: x  Gluc: 85 mg/dL / Ketone: x  / Bili: x / Urobili: x   Blood: x / Protein: x / Nitrite: x   Leuk Esterase: x / RBC: x / WBC x   Sq Epi: x / Non Sq Epi: x / Bacteria: x            RADIOLOGY & ADDITIONAL TESTS:    Care Discussed with Consultants/Other Providers:

## 2024-03-03 NOTE — DISCHARGE NOTE PROVIDER - HOSPITAL COURSE
33 year old male PMH GERD, cholelithiasis,  s/p Lap perfecto 2/16/24, presents to ED with c/o RUQ pain, admitted for acute pancreatitis. Improved clinically and biochemically but MRCP with filling defect suggestive of stone now post sphincterotomy and stone extraction. Patient tolerating diet. Plans to follow up with GI outpatient.         Code Status:     Discharging Provider:  Galindo Raphael NP  Contact Info: [phone]. Please call with any questions or concerns.    Outpatient Provider:  Dr. Sunil Carmen   (notified)

## 2024-03-03 NOTE — PROGRESS NOTE ADULT - PROBLEM SELECTOR PLAN 1
Improving clinically and biochemically but MRCP with filling defect suggestive of stone, now post sphincterotomy and stone extraction.    Advance diet, if pain free    Outpt f/u

## 2024-03-03 NOTE — DISCHARGE NOTE NURSING/CASE MANAGEMENT/SOCIAL WORK - NSDCPEFALRISK_GEN_ALL_CORE
For information on Fall & Injury Prevention, visit: [email or web address] OR  [email or web address] OR  https://www.cdc.gov/steadi/patient.html

## 2024-03-03 NOTE — DISCHARGE NOTE NURSING/CASE MANAGEMENT/SOCIAL WORK - PATIENT PORTAL LINK FT
You can access the FollowMyHealth Patient Portal offered by [facility] by registering at the following website: [email or web address]. By joining [facility]’s FollowMyHealth portal, you will also be able to view your health information using other applications (apps) compatible with our system.

## 2024-05-14 NOTE — ED ADULT NURSE NOTE - NSSEPSISSUSPECTED_ED_A_ED
Below is some helpful information about your upcoming surgery.  Please arrive at [facility] at 0600 on 5-13.  Please do not eat after midnight, the night prior to your surgery.  It is ok to drink clear liquids up until 0330.  Some examples of clear liquids include: water, apple juice, jello or black coffee.   Please don’t drink anything with pulp such as, orange juice.    Please take the following medication the morning of surgery: all except trulicity, adderall, no short acting insulin and only 1/2 of a.m. tresiba.  Please bring medications that you take in their original prescription bottles the day of surgery.  Bring any cases for your contact lens, dentures, partials or glasses.  Leave any valuables at home and remove all jewelry prior to your arrival.  Please don't wear any make-up or hair product the morning of surgery.    Bring your insurance card and a photo ID.  Make sure you have arranged for someone to bring you home.     If you were to become ill prior to your surgery, please contact your family care physician.  The quality of your stay is important to us.  We want to ensure you have excellent care during your stay.  Please don’t hesitate to call with any questions about your surgery at [phone] (hours of operation are 8am-4pm Monday-Friday).  We look forward to caring for you!    Sincerely,   The Pre-surgical Evaluation Department     
Message left on voicemail.   
No

## 2024-08-21 NOTE — ED ADULT TRIAGE NOTE - PAIN RATING/NUMBER SCALE (0-10): ACTIVITY
Cholesterol results look excellent.  Blood counts are within normal limits.  Prostate cancer screening came back normal.  Kidney function appear to be within normal limits 1 (mild pain)

## (undated) DEVICE — TUBING CAP SET ERBEFLO CLEVERCAP HYBRID CO2 FOR OLYMPUS SCOPES AND UCR

## (undated) DEVICE — ORGANIZER MIO MEDICAL DEVICE DISP

## (undated) DEVICE — SOL IRR POUR H2O 1000ML

## (undated) DEVICE — ELCTR NESSY OMEGA RETURN 85CM W/4M CBL

## (undated) DEVICE — LOK DVC RX AND BIOPSY

## (undated) DEVICE — CONTAINER FORMALIN BUFF 10% 60ML

## (undated) DEVICE — INJ SYS RAP REFIL

## (undated) DEVICE — OLYMPUS DISTAL COVER ENDOSCOPE

## (undated) DEVICE — KIT ENDO PROCEDURE CUST W/VLV

## (undated) DEVICE — DVC AUTO CAP RX LOKG